# Patient Record
Sex: MALE | Race: WHITE | Employment: FULL TIME | URBAN - NONMETROPOLITAN AREA
[De-identification: names, ages, dates, MRNs, and addresses within clinical notes are randomized per-mention and may not be internally consistent; named-entity substitution may affect disease eponyms.]

---

## 2020-09-06 ENCOUNTER — APPOINTMENT (OUTPATIENT)
Dept: GENERAL RADIOLOGY | Age: 30
DRG: 083 | End: 2020-09-06
Payer: OTHER GOVERNMENT

## 2020-09-06 ENCOUNTER — HOSPITAL ENCOUNTER (INPATIENT)
Age: 30
LOS: 3 days | Discharge: HOME | DRG: 083 | End: 2020-09-09
Attending: FAMILY MEDICINE | Admitting: SURGERY
Payer: OTHER GOVERNMENT

## 2020-09-06 ENCOUNTER — APPOINTMENT (OUTPATIENT)
Dept: CT IMAGING | Age: 30
DRG: 083 | End: 2020-09-06
Payer: OTHER GOVERNMENT

## 2020-09-06 PROBLEM — I61.9 INTRAPARENCHYMAL HEMORRHAGE OF BRAIN (HCC): Status: ACTIVE | Noted: 2020-09-06

## 2020-09-06 PROBLEM — I62.00 SUBDURAL HEMORRHAGE (HCC): Status: ACTIVE | Noted: 2020-09-06

## 2020-09-06 PROBLEM — S12.000A C1 CERVICAL FRACTURE (HCC): Status: ACTIVE | Noted: 2020-09-06
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
## 2020-09-06 PROBLEM — S82.122A: Status: ACTIVE | Noted: 2020-09-06

## 2020-09-06 PROBLEM — S32.009A LUMBAR TRANSVERSE PROCESS FRACTURE (HCC): Status: ACTIVE | Noted: 2020-09-06

## 2020-09-06 LAB
ALBUMIN SERPL-MCNC: 4.6 G/DL (ref 3.5–5.1)
ALP BLD-CCNC: 81 U/L (ref 38–126)
ALT SERPL-CCNC: 25 U/L (ref 11–66)
AMPHETAMINE+METHAMPHETAMINE URINE SCREEN: NEGATIVE
ANION GAP SERPL CALCULATED.3IONS-SCNC: 11 MEQ/L (ref 8–16)
APTT: 29.9 SECONDS (ref 22–38)
AST SERPL-CCNC: 27 U/L (ref 5–40)
BARBITURATE QUANTITATIVE URINE: NEGATIVE
BASOPHILS # BLD: 0.4 %
BASOPHILS ABSOLUTE: 0.1 THOU/MM3 (ref 0–0.1)
BENZODIAZEPINE QUANTITATIVE URINE: NEGATIVE
BILIRUB SERPL-MCNC: 1 MG/DL (ref 0.3–1.2)
BILIRUBIN DIRECT: 0.3 MG/DL (ref 0–0.3)
BILIRUBIN URINE: NEGATIVE
BLOOD, URINE: NEGATIVE
BUN BLDV-MCNC: 12 MG/DL (ref 7–22)
CALCIUM SERPL-MCNC: 9.6 MG/DL (ref 8.5–10.5)
CANNABINOID QUANTITATIVE URINE: POSITIVE
CHARACTER, URINE: CLEAR
CHLORIDE BLD-SCNC: 98 MEQ/L (ref 98–111)
CO2: 23 MEQ/L (ref 23–33)
COCAINE METABOLITE QUANTITATIVE URINE: NEGATIVE
COLOR: YELLOW
CREAT SERPL-MCNC: 0.6 MG/DL (ref 0.4–1.2)
EOSINOPHIL # BLD: 1.6 %
EOSINOPHILS ABSOLUTE: 0.2 THOU/MM3 (ref 0–0.4)
ERYTHROCYTE [DISTWIDTH] IN BLOOD BY AUTOMATED COUNT: 12.6 % (ref 11.5–14.5)
ERYTHROCYTE [DISTWIDTH] IN BLOOD BY AUTOMATED COUNT: 42.7 FL (ref 35–45)
ETHYL ALCOHOL, SERUM: < 0.01 %
GFR SERPL CREATININE-BSD FRML MDRD: > 90 ML/MIN/1.73M2
GLUCOSE BLD-MCNC: 148 MG/DL (ref 70–108)
GLUCOSE, URINE: NEGATIVE MG/DL
HCT VFR BLD CALC: 45.7 % (ref 42–52)
HEMOGLOBIN: 16.2 GM/DL (ref 14–18)
IMMATURE GRANS (ABS): 0.08 THOU/MM3 (ref 0–0.07)
IMMATURE GRANULOCYTES: 0.6 %
INR BLD: 1.01 (ref 0.85–1.13)
KETONES, URINE: NEGATIVE
LEUKOCYTE EST, POC: NEGATIVE
LIPASE: 17.2 U/L (ref 5.6–51.3)
LYMPHOCYTES # BLD: 19 %
LYMPHOCYTES ABSOLUTE: 2.6 THOU/MM3 (ref 1–4.8)
MCH RBC QN AUTO: 32.9 PG (ref 26–33)
MCHC RBC AUTO-ENTMCNC: 35.4 GM/DL (ref 32.2–35.5)
MCV RBC AUTO: 92.7 FL (ref 80–94)
MONOCYTES # BLD: 8.7 %
MONOCYTES ABSOLUTE: 1.2 THOU/MM3 (ref 0.4–1.3)
MRSA SCREEN RT-PCR: NEGATIVE
NITRITE, URINE: NEGATIVE
NUCLEATED RED BLOOD CELLS: 0 /100 WBC
OPIATES, URINE: NEGATIVE
OSMOLALITY CALCULATION: 267 MOSMOL/KG (ref 275–300)
OXYCODONE: NEGATIVE
PH UA: 5.5 (ref 5–9)
PHENCYCLIDINE QUANTITATIVE URINE: NEGATIVE
PLATELET # BLD: 202 THOU/MM3 (ref 130–400)
PMV BLD AUTO: 9.7 FL (ref 9.4–12.4)
POTASSIUM SERPL-SCNC: 3.6 MEQ/L (ref 3.5–5.2)
PROTEIN UA: NEGATIVE MG/DL
RBC # BLD: 4.93 MILL/MM3 (ref 4.7–6.1)
SEG NEUTROPHILS: 69.7 %
SEGMENTED NEUTROPHILS ABSOLUTE COUNT: 9.4 THOU/MM3 (ref 1.8–7.7)
SODIUM BLD-SCNC: 132 MEQ/L (ref 135–145)
SPECIFIC GRAVITY UA: > 1.03 (ref 1–1.03)
TOTAL PROTEIN: 7.5 G/DL (ref 6.1–8)
UROBILINOGEN, URINE: 1 EU/DL (ref 0–1)
VANCOMYCIN RESISTANT ENTEROCOCCUS: NEGATIVE
WBC # BLD: 13.5 THOU/MM3 (ref 4.8–10.8)

## 2020-09-06 PROCEDURE — 83690 ASSAY OF LIPASE: CPT

## 2020-09-06 PROCEDURE — 76376 3D RENDER W/INTRP POSTPROCES: CPT

## 2020-09-06 PROCEDURE — 73564 X-RAY EXAM KNEE 4 OR MORE: CPT

## 2020-09-06 PROCEDURE — 36415 COLL VENOUS BLD VENIPUNCTURE: CPT

## 2020-09-06 PROCEDURE — 85025 COMPLETE CBC W/AUTO DIFF WBC: CPT

## 2020-09-06 PROCEDURE — 71260 CT THORAX DX C+: CPT

## 2020-09-06 PROCEDURE — 73590 X-RAY EXAM OF LOWER LEG: CPT

## 2020-09-06 PROCEDURE — L0180 CER POST COL OCC/MAN SUP ADJ: HCPCS

## 2020-09-06 PROCEDURE — 99222 1ST HOSP IP/OBS MODERATE 55: CPT | Performed by: SURGERY

## 2020-09-06 PROCEDURE — 80307 DRUG TEST PRSMV CHEM ANLYZR: CPT

## 2020-09-06 PROCEDURE — 2500000003 HC RX 250 WO HCPCS: Performed by: PHYSICIAN ASSISTANT

## 2020-09-06 PROCEDURE — 87500 VANOMYCIN DNA AMP PROBE: CPT

## 2020-09-06 PROCEDURE — APPSS180 APP SPLIT SHARED TIME > 60 MINUTES: Performed by: PHYSICIAN ASSISTANT

## 2020-09-06 PROCEDURE — 81003 URINALYSIS AUTO W/O SCOPE: CPT

## 2020-09-06 PROCEDURE — 99283 EMERGENCY DEPT VISIT LOW MDM: CPT

## 2020-09-06 PROCEDURE — 74177 CT ABD & PELVIS W/CONTRAST: CPT

## 2020-09-06 PROCEDURE — 2580000003 HC RX 258: Performed by: PHYSICIAN ASSISTANT

## 2020-09-06 PROCEDURE — 6370000000 HC RX 637 (ALT 250 FOR IP): Performed by: PHYSICIAN ASSISTANT

## 2020-09-06 PROCEDURE — 70450 CT HEAD/BRAIN W/O DYE: CPT

## 2020-09-06 PROCEDURE — 2580000003 HC RX 258: Performed by: FAMILY MEDICINE

## 2020-09-06 PROCEDURE — 85730 THROMBOPLASTIN TIME PARTIAL: CPT

## 2020-09-06 PROCEDURE — 72125 CT NECK SPINE W/O DYE: CPT

## 2020-09-06 PROCEDURE — 87641 MR-STAPH DNA AMP PROBE: CPT

## 2020-09-06 PROCEDURE — 85610 PROTHROMBIN TIME: CPT

## 2020-09-06 PROCEDURE — 80053 COMPREHEN METABOLIC PANEL: CPT

## 2020-09-06 PROCEDURE — 87081 CULTURE SCREEN ONLY: CPT

## 2020-09-06 PROCEDURE — 82248 BILIRUBIN DIRECT: CPT

## 2020-09-06 PROCEDURE — 99285 EMERGENCY DEPT VISIT HI MDM: CPT

## 2020-09-06 PROCEDURE — G0480 DRUG TEST DEF 1-7 CLASSES: HCPCS

## 2020-09-06 PROCEDURE — 6360000004 HC RX CONTRAST MEDICATION: Performed by: FAMILY MEDICINE

## 2020-09-06 PROCEDURE — 2000000000 HC ICU R&B

## 2020-09-06 PROCEDURE — 99284 EMERGENCY DEPT VISIT MOD MDM: CPT

## 2020-09-06 RX ORDER — SODIUM CHLORIDE 9 MG/ML
INJECTION, SOLUTION INTRAVENOUS CONTINUOUS
Status: DISCONTINUED | OUTPATIENT
Start: 2020-09-06 | End: 2020-09-08

## 2020-09-06 RX ORDER — SODIUM CHLORIDE 9 MG/ML
INJECTION, SOLUTION INTRAVENOUS CONTINUOUS
Status: DISCONTINUED | OUTPATIENT
Start: 2020-09-06 | End: 2020-09-06 | Stop reason: HOSPADM

## 2020-09-06 RX ORDER — POLYETHYLENE GLYCOL 3350 17 G/17G
17 POWDER, FOR SOLUTION ORAL DAILY PRN
Status: DISCONTINUED | OUTPATIENT
Start: 2020-09-06 | End: 2020-09-09 | Stop reason: HOSPADM

## 2020-09-06 RX ORDER — POTASSIUM CHLORIDE 7.45 MG/ML
10 INJECTION INTRAVENOUS PRN
Status: DISCONTINUED | OUTPATIENT
Start: 2020-09-06 | End: 2020-09-09 | Stop reason: HOSPADM

## 2020-09-06 RX ORDER — HYDROCODONE BITARTRATE AND ACETAMINOPHEN 5; 325 MG/1; MG/1
2 TABLET ORAL EVERY 4 HOURS PRN
Status: DISCONTINUED | OUTPATIENT
Start: 2020-09-06 | End: 2020-09-09 | Stop reason: HOSPADM

## 2020-09-06 RX ORDER — PROMETHAZINE HYDROCHLORIDE 25 MG/1
12.5 TABLET ORAL EVERY 6 HOURS PRN
Status: DISCONTINUED | OUTPATIENT
Start: 2020-09-06 | End: 2020-09-09 | Stop reason: HOSPADM

## 2020-09-06 RX ORDER — HYDROCODONE BITARTRATE AND ACETAMINOPHEN 5; 325 MG/1; MG/1
1 TABLET ORAL EVERY 4 HOURS PRN
Status: DISCONTINUED | OUTPATIENT
Start: 2020-09-06 | End: 2020-09-09 | Stop reason: HOSPADM

## 2020-09-06 RX ORDER — ONDANSETRON 2 MG/ML
4 INJECTION INTRAMUSCULAR; INTRAVENOUS EVERY 6 HOURS PRN
Status: DISCONTINUED | OUTPATIENT
Start: 2020-09-06 | End: 2020-09-09 | Stop reason: HOSPADM

## 2020-09-06 RX ORDER — MONTELUKAST SODIUM 10 MG/1
10 TABLET ORAL NIGHTLY
COMMUNITY

## 2020-09-06 RX ORDER — ACETAMINOPHEN 325 MG/1
650 TABLET ORAL EVERY 4 HOURS PRN
Status: DISCONTINUED | OUTPATIENT
Start: 2020-09-06 | End: 2020-09-09 | Stop reason: HOSPADM

## 2020-09-06 RX ORDER — MORPHINE SULFATE 4 MG/ML
4 INJECTION, SOLUTION INTRAMUSCULAR; INTRAVENOUS
Status: DISCONTINUED | OUTPATIENT
Start: 2020-09-06 | End: 2020-09-09 | Stop reason: HOSPADM

## 2020-09-06 RX ORDER — MORPHINE SULFATE 2 MG/ML
2 INJECTION, SOLUTION INTRAMUSCULAR; INTRAVENOUS
Status: DISCONTINUED | OUTPATIENT
Start: 2020-09-06 | End: 2020-09-09 | Stop reason: HOSPADM

## 2020-09-06 RX ORDER — DOCUSATE SODIUM 100 MG/1
100 CAPSULE, LIQUID FILLED ORAL DAILY
Status: DISCONTINUED | OUTPATIENT
Start: 2020-09-06 | End: 2020-09-09 | Stop reason: HOSPADM

## 2020-09-06 RX ORDER — SODIUM CHLORIDE 0.9 % (FLUSH) 0.9 %
10 SYRINGE (ML) INJECTION PRN
Status: DISCONTINUED | OUTPATIENT
Start: 2020-09-06 | End: 2020-09-09 | Stop reason: HOSPADM

## 2020-09-06 RX ORDER — SODIUM CHLORIDE 0.9 % (FLUSH) 0.9 %
10 SYRINGE (ML) INJECTION EVERY 12 HOURS SCHEDULED
Status: DISCONTINUED | OUTPATIENT
Start: 2020-09-06 | End: 2020-09-09 | Stop reason: HOSPADM

## 2020-09-06 RX ADMIN — IOPAMIDOL 80 ML: 755 INJECTION, SOLUTION INTRAVENOUS at 17:13

## 2020-09-06 RX ADMIN — Medication 10 ML: at 20:11

## 2020-09-06 RX ADMIN — SODIUM CHLORIDE: 9 INJECTION, SOLUTION INTRAVENOUS at 20:09

## 2020-09-06 RX ADMIN — FAMOTIDINE 20 MG: 10 INJECTION INTRAVENOUS at 20:10

## 2020-09-06 RX ADMIN — SODIUM CHLORIDE: 9 INJECTION, SOLUTION INTRAVENOUS at 16:30

## 2020-09-06 RX ADMIN — DOCUSATE SODIUM 100 MG: 100 CAPSULE, LIQUID FILLED ORAL at 20:12

## 2020-09-06 RX ADMIN — TRANEXAMIC ACID 1000 MG: 1 INJECTION, SOLUTION INTRAVENOUS at 19:01

## 2020-09-06 RX ADMIN — HYDROCODONE BITARTRATE AND ACETAMINOPHEN 1 TABLET: 5; 325 TABLET ORAL at 20:10

## 2020-09-06 RX ADMIN — TRANEXAMIC ACID 1000 MG: 1 INJECTION, SOLUTION INTRAVENOUS at 19:18

## 2020-09-06 ASSESSMENT — PAIN DESCRIPTION - ONSET
ONSET_2: ON-GOING
ONSET: ON-GOING
ONSET_3: ON-GOING
ONSET: ON-GOING

## 2020-09-06 ASSESSMENT — PAIN DESCRIPTION - ORIENTATION
ORIENTATION_3: POSTERIOR;UPPER;LOWER
ORIENTATION: LEFT
ORIENTATION_2: UPPER;LOWER
ORIENTATION: LEFT

## 2020-09-06 ASSESSMENT — ENCOUNTER SYMPTOMS
COUGH: 0
VOICE CHANGE: 0
ABDOMINAL DISTENTION: 0
PHOTOPHOBIA: 0
BACK PAIN: 0
FACIAL SWELLING: 0
RHINORRHEA: 0
EYE DISCHARGE: 0
VOMITING: 0
COLOR CHANGE: 1
ABDOMINAL PAIN: 0
EYE PAIN: 0
SORE THROAT: 0
TROUBLE SWALLOWING: 0
NAUSEA: 0
SHORTNESS OF BREATH: 0

## 2020-09-06 ASSESSMENT — PAIN DESCRIPTION - LOCATION
LOCATION_3: NECK
LOCATION_2: BACK
LOCATION: KNEE
LOCATION: KNEE

## 2020-09-06 ASSESSMENT — PAIN DESCRIPTION - DURATION
DURATION_2: CONTINUOUS
DURATION_3: CONTINUOUS

## 2020-09-06 ASSESSMENT — PAIN DESCRIPTION - PAIN TYPE
TYPE: ACUTE PAIN
TYPE_3: ACUTE PAIN
TYPE_2: ACUTE PAIN
TYPE: ACUTE PAIN

## 2020-09-06 ASSESSMENT — PAIN DESCRIPTION - PROGRESSION
CLINICAL_PROGRESSION: NOT CHANGED
CLINICAL_PROGRESSION: NOT CHANGED
CLINICAL_PROGRESSION_3: NOT CHANGED
CLINICAL_PROGRESSION: NOT CHANGED
CLINICAL_PROGRESSION: NOT CHANGED
CLINICAL_PROGRESSION_2: NOT CHANGED
CLINICAL_PROGRESSION: NOT CHANGED

## 2020-09-06 ASSESSMENT — PAIN DESCRIPTION - FREQUENCY
FREQUENCY: CONTINUOUS
FREQUENCY: CONTINUOUS

## 2020-09-06 ASSESSMENT — PAIN DESCRIPTION - DESCRIPTORS
DESCRIPTORS: ACHING
DESCRIPTORS_3: ACHING
DESCRIPTORS: ACHING
DESCRIPTORS_2: ACHING

## 2020-09-06 ASSESSMENT — PAIN DESCRIPTION - INTENSITY
RATING_3: 0
RATING_2: 0

## 2020-09-06 ASSESSMENT — PAIN SCALES - GENERAL
PAINLEVEL_OUTOF10: 5
PAINLEVEL_OUTOF10: 5
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
## 2020-09-06 NOTE — ED NOTES
Pt resting in bed with Aspen collar placed per Dr. Derick Viera. Pt states no further needs at this time. Vital signs stable, will continue to assess.        [facility]  09/06/20 2803
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
## 2020-09-06 NOTE — ED NOTES
Pt A&O X4 at this time. Pt pupils 3mm round and reactive to light. Hand grasp/pedal push and pull strong and equal bilaterally. Pt resting with SO at bedside and call light within reach. Pt states no further needs at this time.        Zoltan, [facility]  09/06/20 4814
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
## 2020-09-06 NOTE — ED NOTES
Pt A&O X4, pupils are 3mm round and reactive to light, hand grasp/pedal push and pull strong and equal bilaterally. Pt resting with SO at bedside. Vital signs stable, will continue to monitor.        [facility]  09/06/20 9572
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
## 2020-09-07 ENCOUNTER — APPOINTMENT (OUTPATIENT)
Dept: MRI IMAGING | Age: 30
DRG: 083 | End: 2020-09-07
Payer: OTHER GOVERNMENT

## 2020-09-07 ENCOUNTER — APPOINTMENT (OUTPATIENT)
Dept: CT IMAGING | Age: 30
DRG: 083 | End: 2020-09-07
Payer: OTHER GOVERNMENT
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
## 2020-09-07 LAB
ANION GAP SERPL CALCULATED.3IONS-SCNC: 7 MEQ/L (ref 8–16)
BASOPHILS # BLD: 0.6 %
BASOPHILS ABSOLUTE: 0.1 THOU/MM3 (ref 0–0.1)
BUN BLDV-MCNC: 7 MG/DL (ref 7–22)
CALCIUM SERPL-MCNC: 8.8 MG/DL (ref 8.5–10.5)
CHLORIDE BLD-SCNC: 103 MEQ/L (ref 98–111)
CO2: 26 MEQ/L (ref 23–33)
CREAT SERPL-MCNC: 0.6 MG/DL (ref 0.4–1.2)
EOSINOPHIL # BLD: 5.3 %
EOSINOPHILS ABSOLUTE: 0.6 THOU/MM3 (ref 0–0.4)
ERYTHROCYTE [DISTWIDTH] IN BLOOD BY AUTOMATED COUNT: 12.8 % (ref 11.5–14.5)
ERYTHROCYTE [DISTWIDTH] IN BLOOD BY AUTOMATED COUNT: 45.3 FL (ref 35–45)
GFR SERPL CREATININE-BSD FRML MDRD: > 90 ML/MIN/1.73M2
GLUCOSE BLD-MCNC: 139 MG/DL (ref 70–108)
HCT VFR BLD CALC: 45.2 % (ref 42–52)
HEMOGLOBIN: 15.5 GM/DL (ref 14–18)
IMMATURE GRANS (ABS): 0.03 THOU/MM3 (ref 0–0.07)
IMMATURE GRANULOCYTES: 0.3 %
LYMPHOCYTES # BLD: 23.9 %
LYMPHOCYTES ABSOLUTE: 2.5 THOU/MM3 (ref 1–4.8)
MCH RBC QN AUTO: 33 PG (ref 26–33)
MCHC RBC AUTO-ENTMCNC: 34.3 GM/DL (ref 32.2–35.5)
MCV RBC AUTO: 96.2 FL (ref 80–94)
MONOCYTES # BLD: 7.4 %
MONOCYTES ABSOLUTE: 0.8 THOU/MM3 (ref 0.4–1.3)
NUCLEATED RED BLOOD CELLS: 0 /100 WBC
PLATELET # BLD: 195 THOU/MM3 (ref 130–400)
PMV BLD AUTO: 9.7 FL (ref 9.4–12.4)
POTASSIUM REFLEX MAGNESIUM: 4.2 MEQ/L (ref 3.5–5.2)
RBC # BLD: 4.7 MILL/MM3 (ref 4.7–6.1)
SEG NEUTROPHILS: 62.5 %
SEGMENTED NEUTROPHILS ABSOLUTE COUNT: 6.5 THOU/MM3 (ref 1.8–7.7)
SODIUM BLD-SCNC: 136 MEQ/L (ref 135–145)
WBC # BLD: 10.4 THOU/MM3 (ref 4.8–10.8)

## 2020-09-07 PROCEDURE — 73721 MRI JNT OF LWR EXTRE W/O DYE: CPT

## 2020-09-07 PROCEDURE — 6370000000 HC RX 637 (ALT 250 FOR IP): Performed by: PHYSICIAN ASSISTANT

## 2020-09-07 PROCEDURE — 80048 BASIC METABOLIC PNL TOTAL CA: CPT

## 2020-09-07 PROCEDURE — APPSS60 APP SPLIT SHARED TIME 46-60 MINUTES: Performed by: PHYSICIAN ASSISTANT

## 2020-09-07 PROCEDURE — 2000000000 HC ICU R&B

## 2020-09-07 PROCEDURE — 2580000003 HC RX 258: Performed by: PHYSICIAN ASSISTANT

## 2020-09-07 PROCEDURE — 94761 N-INVAS EAR/PLS OXIMETRY MLT: CPT

## 2020-09-07 PROCEDURE — 85025 COMPLETE CBC W/AUTO DIFF WBC: CPT

## 2020-09-07 PROCEDURE — 99291 CRITICAL CARE FIRST HOUR: CPT | Performed by: NEUROLOGICAL SURGERY

## 2020-09-07 PROCEDURE — 36415 COLL VENOUS BLD VENIPUNCTURE: CPT

## 2020-09-07 PROCEDURE — 72141 MRI NECK SPINE W/O DYE: CPT

## 2020-09-07 PROCEDURE — 70450 CT HEAD/BRAIN W/O DYE: CPT

## 2020-09-07 PROCEDURE — 2500000003 HC RX 250 WO HCPCS: Performed by: PHYSICIAN ASSISTANT

## 2020-09-07 PROCEDURE — APPSS60 APP SPLIT SHARED TIME 46-60 MINUTES: Performed by: NURSE PRACTITIONER

## 2020-09-07 RX ADMIN — DOCUSATE SODIUM 100 MG: 100 CAPSULE, LIQUID FILLED ORAL at 08:30

## 2020-09-07 RX ADMIN — HYDROCODONE BITARTRATE AND ACETAMINOPHEN 2 TABLET: 5; 325 TABLET ORAL at 20:46

## 2020-09-07 RX ADMIN — FAMOTIDINE 20 MG: 10 INJECTION INTRAVENOUS at 20:46

## 2020-09-07 RX ADMIN — Medication 10 ML: at 08:31

## 2020-09-07 RX ADMIN — HYDROCODONE BITARTRATE AND ACETAMINOPHEN 1 TABLET: 5; 325 TABLET ORAL at 01:12

## 2020-09-07 RX ADMIN — HYDROCODONE BITARTRATE AND ACETAMINOPHEN 1 TABLET: 5; 325 TABLET ORAL at 06:31

## 2020-09-07 RX ADMIN — HYDROCODONE BITARTRATE AND ACETAMINOPHEN 1 TABLET: 5; 325 TABLET ORAL at 16:10

## 2020-09-07 RX ADMIN — SODIUM CHLORIDE: 9 INJECTION, SOLUTION INTRAVENOUS at 06:01

## 2020-09-07 RX ADMIN — HYDROCODONE BITARTRATE AND ACETAMINOPHEN 1 TABLET: 5; 325 TABLET ORAL at 10:28

## 2020-09-07 RX ADMIN — SODIUM CHLORIDE: 9 INJECTION, SOLUTION INTRAVENOUS at 18:06

## 2020-09-07 RX ADMIN — FAMOTIDINE 20 MG: 10 INJECTION INTRAVENOUS at 08:30

## 2020-09-07 ASSESSMENT — PAIN DESCRIPTION - PAIN TYPE
TYPE: ACUTE PAIN

## 2020-09-07 ASSESSMENT — PAIN DESCRIPTION - ONSET: ONSET: ON-GOING

## 2020-09-07 ASSESSMENT — PAIN DESCRIPTION - ORIENTATION
ORIENTATION: LEFT

## 2020-09-07 ASSESSMENT — PAIN SCALES - GENERAL
PAINLEVEL_OUTOF10: 2
PAINLEVEL_OUTOF10: 2
PAINLEVEL_OUTOF10: 4
PAINLEVEL_OUTOF10: 2
PAINLEVEL_OUTOF10: 4
PAINLEVEL_OUTOF10: 2
PAINLEVEL_OUTOF10: 6
PAINLEVEL_OUTOF10: 4
PAINLEVEL_OUTOF10: 3
PAINLEVEL_OUTOF10: 2
PAINLEVEL_OUTOF10: 7

## 2020-09-07 ASSESSMENT — PAIN DESCRIPTION - FREQUENCY: FREQUENCY: CONTINUOUS

## 2020-09-07 ASSESSMENT — PAIN DESCRIPTION - PROGRESSION
CLINICAL_PROGRESSION: NOT CHANGED
CLINICAL_PROGRESSION: GRADUALLY WORSENING
CLINICAL_PROGRESSION: NOT CHANGED

## 2020-09-07 ASSESSMENT — PAIN DESCRIPTION - LOCATION
LOCATION: BACK;KNEE;NECK
LOCATION: KNEE

## 2020-09-07 ASSESSMENT — PAIN DESCRIPTION - DESCRIPTORS: DESCRIPTORS: ACHING

## 2020-09-07 NOTE — PLAN OF CARE
Problem: Pain:  Goal: Pain level will decrease  Description: Pain level will decrease  9/7/2020 1954 by Dennise Sarah  Outcome: Met This Shift  Note: Pt's pain well controlled this shift. Patient states pain goal 2/10. Pt's pain level is rated as a 2/10 in his neck and back, and a 3/10 in his knee. Problem: Pain:  Goal: Control of acute pain  Description: Control of acute pain  9/7/2020 1954 by Dennise Sarah  Outcome: Met This Shift  Note: Pt's pain well controlled this shift. Patient states pain goal 2/10. Pt's pain level is rated as a 2/10 in his neck and back, and a 3/10 in his knee. Problem: Discharge Planning:  Goal: Participates in care planning  Description: Participates in care planning  9/7/2020 1954 by Dennise Sarah  Outcome: Met This Shift  Note: Pt. Participates in care planning and is compliant with orders. Patient hopeful to remove C-collar and move out of ICU tomorrow. Problem: Bowel Function - Altered:  Goal: Bowel elimination is within specified parameters  Description: Bowel elimination is within specified parameters  9/7/2020 1954 by Dennise Sarah  Outcome: Met This Shift  Note: Pt. Has active bowel sounds, soft belly, and making gas. Problem: Mental Status - Impaired:  Goal: Mental status will be restored to baseline  Description: Mental status will be restored to baseline  9/7/2020 1954 by Dennise Sarah  Outcome: Met This Shift  Note: Pt's mental status is at baseline this shift per patients significant other. Problem: Skin Integrity - Impaired:  Goal: Will show no infection signs and symptoms  Description: Will show no infection signs and symptoms  9/7/2020 1954 by Dennise Sarah  Outcome: Met This Shift  Note: Pt. Afebrile this shift. No signs of skin infection.       Problem: Skin Integrity - Impaired:  Goal: Absence of new skin breakdown  Description: Absence of new skin breakdown  9/7/2020 1954 by Dennise Sarah  Outcome: Met This Shift  Note: No new skin breakdown noted
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
## 2020-09-07 NOTE — CONSULTS
day [place] Slice, PA-C   10 mL at 09/07/20 0831    sodium chloride flush 0.9 % injection 10 mL  10 mL Intravenous PRN Kayce Slice, PA-C        acetaminophen (TYLENOL) tablet 650 mg  650 mg Oral Q4H PRN [place] Slice, PA-C        polyethylene glycol [facility]) packet 17 g  17 g Oral Daily PRN [place] Slice, PA-C        promethazine (PHENERGAN) tablet 12.5 mg  12.5 mg Oral Q6H PRN Kayce Slice, PA-C        Or    ondansetron [facility]) injection 4 mg  4 mg Intravenous Q6H PRN Kayce Slice, PA-C        0.9 % sodium chloride infusion   Intravenous Continuous [place] Slice, PA-C 100 mL/hr at 09/07/20 0601      potassium chloride 10 mEq/100 mL IVPB (Peripheral Line)  10 mEq Intravenous PRN [place] Slice, PA-C        HYDROcodone-acetaminophen [facility]) 5-325 MG per tablet 1 tablet  1 tablet Oral Q4H PRN Kayce Slice, PA-C   1 tablet at 09/07/20 1028    Or    HYDROcodone-acetaminophen (NORCO) 5-325 MG per tablet 2 tablet  2 tablet Oral Q4H PRN [place] Slice, PA-C        morphine (PF) injection 2 mg  2 mg Intravenous Q2H PRN Kayce Slice, PA-C        Or    morphine injection 4 mg  4 mg Intravenous Q2H PRN [place] Slice, PA-C        docusate sodium (COLACE) capsule 100 mg  100 mg Oral Daily [place] Slice, PA-C   100 mg at 09/07/20 0830    famotidine (PEPCID) injection 20 mg  20 mg Intravenous BID [place] Slice, PA-C   20 mg at 09/07/20 0830         Allergies:  Seasonal and Sulfa antibiotics    Social History:   Social History     Tobacco Use   Smoking Status Current Every Day Smoker    Packs/day: 1.00   Smokeless Tobacco Former User     Social History     Substance and Sexual Activity   Alcohol Use Yes    Comment: 5 beers/week      Social History     Substance and Sexual Activity   Drug Use Yes    Types: Marijuana    Comment: Medical        Family History:  History reviewed. No pertinent family history.       REVIEW OF SYSTEMS:  Gen: Negative for nausea, vomiting, diarrhea, fever, chills, night sweats  Heart: Negative for HTN, palpitations, chest pain  Lungs: Negative for wheezes, asthma or SOB  GI: Negative for nausea, vomiting  Endo: Negative for diabetes  Heme: Negative for DVT   Musculoskeletal:  Positive for arthralgia, joint effusion, joint stiffness      PHYSICAL EXAM:  Patient Vitals for the past 24 hrs:   BP Temp Temp src Pulse Resp SpO2 Height Weight   09/07/20 1030 (!) 146/85 -- -- 82 15 95 % -- --   09/07/20 1000 (!) 147/83 -- -- 85 23 -- -- --   09/07/20 0900 (!) 120/104 -- -- 76 19 -- -- --   09/07/20 0830 120/80 -- -- 79 16 95 % -- --   09/07/20 0800 122/83 -- -- 79 21 -- -- --   09/07/20 0730 125/88 -- -- 92 23 -- -- --   09/07/20 0700 128/76 -- -- 74 13 97 % -- --   09/07/20 0600 127/86 -- -- 84 15 96 % -- --   09/07/20 0500 107/73 97.3 °F (36.3 °C) Oral 78 12 96 % -- 194 lb 10.7 oz (88.3 kg)   09/07/20 0400 117/72 -- -- 74 12 96 % -- --   09/07/20 0300 (!) 117/59 -- -- 76 13 96 % -- --   09/07/20 0200 124/81 -- -- 91 19 96 % -- --   09/07/20 0100 120/74 98.1 °F (36.7 °C) Oral 88 16 95 % -- --   09/07/20 0030 122/84 -- -- 88 18 96 % -- --   09/07/20 0000 117/78 -- -- 85 14 95 % -- --   09/06/20 2330 128/76 -- -- 85 18 96 % -- --   09/06/20 2300 128/78 -- -- 88 16 95 % -- --   09/06/20 2230 119/77 -- -- 82 15 95 % -- --   09/06/20 2200 128/79 -- -- 75 13 97 % -- --   09/06/20 2145 129/76 -- -- 93 16 97 % -- --   09/06/20 2130 131/80 -- -- 82 17 95 % -- --   09/06/20 2115 130/75 -- -- 80 16 95 % -- --   09/06/20 2100 128/88 -- -- 82 16 96 % -- --   09/06/20 2045 (!) 134/99 -- -- 89 15 97 % -- --   09/06/20 2030 (!) 134/93 -- -- 89 15 98 % -- --   09/06/20 2015 (!) 134/94 -- -- 89 16 97 % -- --   09/06/20 2002 -- -- -- -- -- -- 6' 3\" (1.905 m) 192 lb 10.9 oz (87.4 kg)   09/06/20 2000 (!) 145/67 98.4 °F (36.9 °C) Oral 85 17 96 % -- --   09/06/20 1930 (!) 135/95 -- -- 97 18 98 % -- --   09/06/20 1915 (!) 134/96 -- -- 92 18 98 % -- --   09/06/20 1900 (!) 145/94 -- -- 97 18 98 % -- --   09/06/20 1845 (!) 141/91 -- -- 96 16 98 % -- --   09/06/20 1830 129/89 -- -- 91 18 97 % -- --   09/06/20 1800 127/88 -- -- 99 16 96 % -- --   09/06/20 1557 128/83 98.5 °F (36.9 °C) Oral 97 18 96 % 6' 3\" (1.905 m) 205 lb (93 kg)     Gen: alert and oriented x3. Does not appear to be in any acute distress at this time. Mood and affect appear to be appropriate. Head: normorcephalic, abrasions noted from head trauma  Pelvis: stable  LLE:  Moderate to severe effusion noted left knee with exquisite tenderness upon palpation of the knee. Mild abrasions noted throughout the left lower extremity however no evidence of traumatic arthrotomy, punctures wounds, or open fractures. Examination of the knee limited secondary to the swelling and patient being unable to fully relax during the exam.  Examination did however show increased pain medial and lateral aspects with palpation and varus/valgus stress testing. Lachman's test inconclusive secondary to swelling/pain. Calf soft and nontender in all compartments. ROM hip, ankle, toes intact without difficulty or pain. Neurovascular status intact. Intact dorsalis pedal and posterior tibialis pulses. Capillary refill brisk and intact with good sensation. RLE:  No obvious deformity noted with inspection. Full ROM hip, knee, ankle, toes without difficulty nor pain. Neurovascular status intact. Intact distal pulses. Intact capillary refill and sensation. Stress  Testing of the right knee with varus/valgus stressing and Lachmans showed no gross laxity or instability.     DATA:  CBC:   Lab Results   Component Value Date    WBC 13.5 09/06/2020    HGB 16.2 09/06/2020     09/06/2020     BMP:    Lab Results   Component Value Date     09/06/2020    K 3.6 09/06/2020    CL 98 09/06/2020    CO2 23 09/06/2020    BUN 12 09/06/2020    CREATININE 0.6 09/06/2020    CALCIUM 9.6 09/06/2020    GLUCOSE 148 09/06/2020     PT/INR:    Lab Results   Component

## 2020-09-07 NOTE — PROGRESS NOTES
ORTHO PROGRESS NOTE UPDATE    MRI left knee reviewed, consistent with ACL injury  Will look to get patient to one of the ortho sports specialists in our practice to further evaluate, most likely as an outpatient    Daniela Garza PA-C
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
## 2020-09-07 NOTE — ED NOTES
This nurse contacts ICU for report at this time. Feliberto Zarate RN states he will arrive shortly to ED for pt report prior to transport.        [place]  09/06/20 2001
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
## 2020-09-07 NOTE — PLAN OF CARE
Problem: Pain:  Description: Pain management should include both nonpharmacologic and pharmacologic interventions. Goal: Pain level will decrease  Description: Pain level will decrease  Outcome: Ongoing  Note: Patient has intermittent complaints of pain. Medication given as prescribed. Repositioned for comfort. .       Problem: Discharge Planning:  Goal: Participates in care planning  Description: Participates in care planning  Outcome: Ongoing  Note: Active in care planning. Goal: Discharged to appropriate level of care  Description: Discharged to appropriate level of care  Outcome: Ongoing  Note: Patient is not a discharge candidate at this time. Planning in process, patient plans to return home with family. Evaluation of any services needed to be addressed. Problem: Bowel Function - Altered:  Goal: Bowel elimination is within specified parameters  Description: Bowel elimination is within specified parameters  Outcome: Ongoing  Note: No bms this shift, colace given. Problem: Mental Status - Impaired:  Goal: Mental status will be restored to baseline  Description: Mental status will be restored to baseline  Outcome: Ongoing  Note: No neuro deficits. Problem: Skin Integrity - Impaired:  Goal: Absence of new skin breakdown  Description: Absence of new skin breakdown  Outcome: Ongoing  Note: No skin breakdown noted to bony prominences or coccyx area. Patient is turned every 2 hours and prn with back care given at this time. Care plan reviewed with patient and wife. Patient and wife verbalize understanding of the plan of care and contribute to goal setting.

## 2020-09-08 LAB — MRSA SCREEN: NORMAL

## 2020-09-08 PROCEDURE — APPSS30 APP SPLIT SHARED TIME 16-30 MINUTES: Performed by: PHYSICIAN ASSISTANT

## 2020-09-08 PROCEDURE — 99233 SBSQ HOSP IP/OBS HIGH 50: CPT | Performed by: NEUROLOGICAL SURGERY

## 2020-09-08 PROCEDURE — 2060000000 HC ICU INTERMEDIATE R&B

## 2020-09-08 PROCEDURE — 6370000000 HC RX 637 (ALT 250 FOR IP): Performed by: PHYSICIAN ASSISTANT

## 2020-09-08 PROCEDURE — 97165 OT EVAL LOW COMPLEX 30 MIN: CPT

## 2020-09-08 PROCEDURE — 2500000003 HC RX 250 WO HCPCS: Performed by: PHYSICIAN ASSISTANT

## 2020-09-08 PROCEDURE — 97162 PT EVAL MOD COMPLEX 30 MIN: CPT

## 2020-09-08 PROCEDURE — 2580000003 HC RX 258: Performed by: PHYSICIAN ASSISTANT

## 2020-09-08 PROCEDURE — 97535 SELF CARE MNGMENT TRAINING: CPT

## 2020-09-08 PROCEDURE — 92523 SPEECH SOUND LANG COMPREHEN: CPT

## 2020-09-08 PROCEDURE — 97116 GAIT TRAINING THERAPY: CPT

## 2020-09-08 PROCEDURE — APPSS60 APP SPLIT SHARED TIME 46-60 MINUTES: Performed by: PHYSICIAN ASSISTANT

## 2020-09-08 PROCEDURE — 94761 N-INVAS EAR/PLS OXIMETRY MLT: CPT

## 2020-09-08 PROCEDURE — 99232 SBSQ HOSP IP/OBS MODERATE 35: CPT | Performed by: SURGERY

## 2020-09-08 RX ADMIN — Medication 10 ML: at 21:55

## 2020-09-08 RX ADMIN — DOCUSATE SODIUM 100 MG: 100 CAPSULE, LIQUID FILLED ORAL at 08:26

## 2020-09-08 RX ADMIN — FAMOTIDINE 20 MG: 10 INJECTION INTRAVENOUS at 21:55

## 2020-09-08 RX ADMIN — ACETAMINOPHEN 650 MG: 325 TABLET ORAL at 13:38

## 2020-09-08 RX ADMIN — HYDROCODONE BITARTRATE AND ACETAMINOPHEN 1 TABLET: 5; 325 TABLET ORAL at 08:25

## 2020-09-08 RX ADMIN — HYDROCODONE BITARTRATE AND ACETAMINOPHEN 1 TABLET: 5; 325 TABLET ORAL at 21:55

## 2020-09-08 RX ADMIN — HYDROCODONE BITARTRATE AND ACETAMINOPHEN 1 TABLET: 5; 325 TABLET ORAL at 04:13

## 2020-09-08 RX ADMIN — SODIUM CHLORIDE: 9 INJECTION, SOLUTION INTRAVENOUS at 05:19

## 2020-09-08 RX ADMIN — Medication 10 ML: at 08:26

## 2020-09-08 RX ADMIN — FAMOTIDINE 20 MG: 10 INJECTION INTRAVENOUS at 08:25

## 2020-09-08 ASSESSMENT — PAIN DESCRIPTION - ORIENTATION
ORIENTATION_2: POSTERIOR
ORIENTATION: LEFT
ORIENTATION: LEFT

## 2020-09-08 ASSESSMENT — PAIN DESCRIPTION - FREQUENCY
FREQUENCY: CONTINUOUS
FREQUENCY: CONTINUOUS

## 2020-09-08 ASSESSMENT — PAIN SCALES - GENERAL
PAINLEVEL_OUTOF10: 2
PAINLEVEL_OUTOF10: 4
PAINLEVEL_OUTOF10: 2
PAINLEVEL_OUTOF10: 4
PAINLEVEL_OUTOF10: 2
PAINLEVEL_OUTOF10: 1
PAINLEVEL_OUTOF10: 2
PAINLEVEL_OUTOF10: 7
PAINLEVEL_OUTOF10: 7
PAINLEVEL_OUTOF10: 3

## 2020-09-08 ASSESSMENT — PAIN DESCRIPTION - DESCRIPTORS
DESCRIPTORS: ACHING
DESCRIPTORS: ACHING
DESCRIPTORS_2: ACHING

## 2020-09-08 ASSESSMENT — PAIN DESCRIPTION - PROGRESSION
CLINICAL_PROGRESSION_2: NOT CHANGED
CLINICAL_PROGRESSION: GRADUALLY WORSENING
CLINICAL_PROGRESSION: GRADUALLY IMPROVING

## 2020-09-08 ASSESSMENT — PAIN DESCRIPTION - PAIN TYPE
TYPE: ACUTE PAIN
TYPE_2: ACUTE PAIN

## 2020-09-08 ASSESSMENT — PAIN DESCRIPTION - ONSET
ONSET_2: ON-GOING
ONSET: ON-GOING
ONSET: ON-GOING

## 2020-09-08 ASSESSMENT — PAIN DESCRIPTION - LOCATION
LOCATION_2: NECK
LOCATION: KNEE
LOCATION: BACK;KNEE;NECK
LOCATION: KNEE

## 2020-09-08 ASSESSMENT — PAIN DESCRIPTION - INTENSITY: RATING_2: 2

## 2020-09-08 ASSESSMENT — PAIN DESCRIPTION - DURATION: DURATION_2: CONTINUOUS
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
## 2020-09-08 NOTE — PLAN OF CARE
Problem: Discharge Planning:  Goal: Participates in care planning  Description: Participates in care planning  Outcome: Met This Shift  Note: Pt alert and wife at bedside, offering input to plan of care     Problem: Mental Status - Impaired:  Goal: Mental status will be restored to baseline  Description: Mental status will be restored to baseline  Outcome: Met This Shift  Note: Pt is alert and oriented. Continue to monitor     Problem: Falls - Risk of:  Goal: Will remain free from falls  Description: Will remain free from falls  Outcome: Met This Shift  Note: No fall this shift. Pt is alert and cooperative with use of call light. Frequent checks and hourly rounds to assess needs. Bed alarm on     Problem: Falls - Risk of:  Goal: Absence of physical injury  Description: Absence of physical injury  Outcome: Met This Shift     Problem: Pain:  Goal: Pain level will decrease  Description: Pain level will decrease  Outcome: Ongoing  Note: Complains of minimal pain neck and knee. States norco makes him behave funny, so may just medicate with tylenol from now on. Reassess and adjust as needed     Problem: Pain:  Goal: Control of acute pain  Description: Control of acute pain  Outcome: Ongoing     Problem: Pain:  Goal: Control of chronic pain  Description: Control of chronic pain  Outcome: Ongoing     Problem: Discharge Planning:  Goal: Discharged to appropriate level of care  Description: Discharged to appropriate level of care  Outcome: Ongoing  Note: Plan transfer to stepdown today and discharge in near future     Problem: Bowel Function - Altered:  Goal: Bowel elimination is within specified parameters  Description: Bowel elimination is within specified parameters  Outcome: Ongoing  Note: Taking colace as ordered. No BM yet today.      Problem: Skin Integrity - Impaired:  Goal: Will show no infection signs and symptoms  Description: Will show no infection signs and symptoms  Outcome: Ongoing     Problem: Skin Integrity - Impaired:  Goal: Absence of new skin breakdown  Description: Absence of new skin breakdown  Outcome: Ongoing  Note: Assist pt with repositioning. Keep heels and elbows elevated off bed. Watch for pressure from medical devices. Problem: Skin Integrity:  Goal: Will show no infection signs and symptoms  Description: Will show no infection signs and symptoms  Outcome: Ongoing     Problem: Skin Integrity:  Goal: Absence of new skin breakdown  Description: Absence of new skin breakdown  Outcome: Ongoing  Note: Assist with repositioning every 2 hours and PRN. Keep heels and elbows elevated off bed. Watch for pressure from medical devices. Care plan reviewed with patient and family. Patient and family verbalize understanding of the plan of care and contribute to goal setting.

## 2020-09-08 NOTE — PROGRESS NOTES
1925: Patient states his pain level in his neck and back is a 2/10: sore and achy. Communicated with patient that this nurse would contact Dr. Issac Aguillon to see if able to remove the C-collar based upon the read of the MRI completed today. 1949: Sent message to Dr. Issac Aguillon via perfect serve with the MRI results, awaiting response. 1951: Dr. Issac Aguillon responded stating to keep the C-collar in place, but patient can begin to mobilize with the collar in place. 2000: Pt. Removed from reverse trendelenburg position. Placed head of bed at 60 degrees and patient able to turn self in bed at this time. Pt. Provided with a pillow under his head and states he does not have an increase in pain with this movement. 2025: Pt. Up to the side of the bed with no increase in pain verbalized. Pt. Able to ambulate to the toilet with minimal assistance. Pt. Complains of stiffness in his left knee. Pt. Able to ambulate around the room with no increase in pain in his neck or back. 2040: Pt. Back in bed with minimal assistance. Pt. States he is much more comfortable with no increased pain. 0000: Pt. Able to ambulate to the toilet again. Pt. Does not complain of any increased pain with ambulation\mobilization. Pt. States his pain is a 1/10 at this time.
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
## 2020-09-09 VITALS
OXYGEN SATURATION: 96 % | SYSTOLIC BLOOD PRESSURE: 145 MMHG | DIASTOLIC BLOOD PRESSURE: 81 MMHG | HEIGHT: 75 IN | WEIGHT: 192.6 LBS | HEART RATE: 82 BPM | BODY MASS INDEX: 23.95 KG/M2 | TEMPERATURE: 98.3 F | RESPIRATION RATE: 20 BRPM

## 2020-09-09 PROCEDURE — APPSS60 APP SPLIT SHARED TIME 46-60 MINUTES: Performed by: PHYSICIAN ASSISTANT

## 2020-09-09 PROCEDURE — 2580000003 HC RX 258: Performed by: PHYSICIAN ASSISTANT

## 2020-09-09 PROCEDURE — APPNB180 APP NON BILLABLE TIME > 60 MINS: Performed by: PHYSICIAN ASSISTANT

## 2020-09-09 PROCEDURE — 2500000003 HC RX 250 WO HCPCS: Performed by: PHYSICIAN ASSISTANT

## 2020-09-09 PROCEDURE — 6370000000 HC RX 637 (ALT 250 FOR IP): Performed by: PHYSICIAN ASSISTANT

## 2020-09-09 PROCEDURE — 99239 HOSP IP/OBS DSCHRG MGMT >30: CPT | Performed by: SURGERY

## 2020-09-09 PROCEDURE — 97530 THERAPEUTIC ACTIVITIES: CPT

## 2020-09-09 RX ORDER — HYDROCODONE BITARTRATE AND ACETAMINOPHEN 5; 325 MG/1; MG/1
1 TABLET ORAL EVERY 6 HOURS PRN
Qty: 12 TABLET | Refills: 0 | Status: SHIPPED | OUTPATIENT
Start: 2020-09-09 | End: 2020-09-12

## 2020-09-09 RX ADMIN — ACETAMINOPHEN 650 MG: 325 TABLET ORAL at 09:30

## 2020-09-09 RX ADMIN — FAMOTIDINE 20 MG: 10 INJECTION INTRAVENOUS at 09:30

## 2020-09-09 RX ADMIN — Medication 10 ML: at 09:30

## 2020-09-09 RX ADMIN — DOCUSATE SODIUM 100 MG: 100 CAPSULE, LIQUID FILLED ORAL at 09:30

## 2020-09-09 ASSESSMENT — PAIN DESCRIPTION - LOCATION: LOCATION: NECK

## 2020-09-09 ASSESSMENT — PAIN DESCRIPTION - PROGRESSION

## 2020-09-09 ASSESSMENT — PAIN - FUNCTIONAL ASSESSMENT: PAIN_FUNCTIONAL_ASSESSMENT: ACTIVITIES ARE NOT PREVENTED

## 2020-09-09 ASSESSMENT — PAIN DESCRIPTION - PAIN TYPE: TYPE: ACUTE PAIN

## 2020-09-09 ASSESSMENT — PAIN SCALES - GENERAL
PAINLEVEL_OUTOF10: 2
PAINLEVEL_OUTOF10: 2

## 2020-09-09 ASSESSMENT — PAIN DESCRIPTION - ORIENTATION: ORIENTATION: LEFT

## 2020-09-09 ASSESSMENT — PAIN DESCRIPTION - DESCRIPTORS: DESCRIPTORS: ACHING;TIGHTNESS

## 2020-09-09 ASSESSMENT — PAIN DESCRIPTION - ONSET: ONSET: ON-GOING

## 2020-09-09 ASSESSMENT — PAIN DESCRIPTION - FREQUENCY: FREQUENCY: INTERMITTENT
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
## 2020-09-09 NOTE — PROGRESS NOTES
Pt discharged in stable condition with wife. Reviewed AVS with patient and wife including follow up appointments, neck and knee braces. Pt to wear Aspen neck brace until seen at follow up appointment with Dr Jose Guadalupe Arcos 10/14/2020. Okay to drive and go to work per Dr Pastor Gutierrez. No further questions or concerns noted.

## 2020-09-09 NOTE — DISCHARGE SUMMARY
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 >1.030 (A) 1.002 - 1.030    Blood, Urine NEGATIVE NEGATIVE    pH, UA 5.5 5.0 - 9.0    Protein, UA NEGATIVE NEGATIVE mg/dl    Urobilinogen, Urine 1.0 0.0 - 1.0 eu/dl    Nitrite, Urine NEGATIVE NEGATIVE    Leukocytes, UA NEGATIVE NEGATIVE    Color, UA YELLOW YELLOW-STRAW    Character, Urine CLEAR CLR-SL.[id number]   Culture, MRSA, Screening    Collection Time: 09/06/20  8:15 PM    Specimen: Rectum   Result Value Ref Range    MRSA SCREEN No MRSA isolated     MRSA by PCR    Collection Time: 09/06/20  8:15 PM   Result Value Ref Range    MRSA SCREEN RT-PCR NEGATIVE    VRE Screen by PCR    Collection Time: 09/06/20  8:15 PM    Specimen: Rectal Swab   Result Value Ref Range    Vancomycin Resistant Enterococcus NEGATIVE    Basic Metabolic Panel w/ Reflex to MG    Collection Time: 09/07/20 12:03 PM   Result Value Ref Range    Sodium 136 135 - 145 meq/L    Potassium reflex Magnesium 4.2 3.5 - 5.2 meq/L    Chloride 103 98 - 111 meq/L    CO2 26 23 - 33 meq/L    Glucose 139 (H) 70 - 108 mg/dL    BUN 7 7 - 22 mg/dL    CREATININE 0.6 0.4 - 1.2 mg/dL    Calcium 8.8 8.5 - 10.5 mg/dL   CBC auto differential    Collection Time: 09/07/20 12:03 PM   Result Value Ref Range    WBC 10.4 4.8 - 10.8 thou/mm3    RBC 4.70 4.70 - 6.10 mill/mm3    Hemoglobin 15.5 14.0 - 18.0 gm/dl    Hematocrit 45.2 42.0 - 52.0 %    MCV 96.2 (H) 80.0 - 94.0 fL    MCH 33.0 26.0 - 33.0 pg    MCHC 34.3 32.2 - 35.5 gm/dl    RDW-CV 12.8 11.5 - 14.5 %    RDW-SD 45.3 (H) 35.0 - 45.0 fL    Platelets 106 713 - 510 thou/mm3    MPV 9.7 9.4 - 12.4 fL    Seg Neutrophils 62.5 %    Lymphocytes 23.9 %    Monocytes 7.4 %    Eosinophils 5.3 %    Basophils 0.6 %    Immature Granulocytes 0.3 %    Segs Absolute 6.5 1.8 - 7.7 thou/mm3    Lymphocytes Absolute 2.5 1.0 - 4.8 thou/mm3    Monocytes Absolute 0.8 0.4 - 1.3 thou/mm3    Eosinophils Absolute 0.6 (H) 0.0 - 0.4 thou/mm3    Basophils Absolute 0.1 0.0 - 0.1 thou/mm3    Immature Grans (Abs) 0.03 0.00 - 0.07 thou/mm3    nRBC 0 /100 wbc   Anion Gap    Collection Time: 09/07/20 12:03 PM   Result Value Ref Range    Anion Gap 7.0 (L) 8.0 - 16.0 meq/L   Glomerular Filtration Rate, Estimated    Collection Time: 09/07/20 12:03 PM   Result Value Ref Range    Est, Glom Filt Rate >90 ml/min/1.73m2       Discharge condition: Stable  Disposition: Home  Time spent on discharge: >60 minutes     Electronically signed by Maggy Corley PA-C on 9/9/2020 at 12:39 PM
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
## 2020-09-09 NOTE — PLAN OF CARE
Problem: Pain:  Goal: Pain level will decrease  Description: Pain level will decrease  Outcome: Ongoing  Note: Patient complains of some left leg pain 4/10. PRN Norco given as ordered. Patient responds well to this. Will continue to monitor. Problem: Pain:  Goal: Control of acute pain  Description: Control of acute pain  Outcome: Ongoing  Note: Patient complains of some left leg pain 4/10. PRN Norco given as ordered. Patient responds well to this. Will continue to monitor. Problem: Pain:  Goal: Control of chronic pain  Description: Control of chronic pain  Outcome: Ongoing  Note: Patient complains of some left leg pain 4/10. PRN Norco given as ordered. Patient responds well to this. Will continue to monitor. Problem: Discharge Planning:  Goal: Participates in care planning  Description: Participates in care planning  Outcome: Ongoing  Note: Patient actively involved in planning of care. Problem: Discharge Planning:  Goal: Discharged to appropriate level of care  Description: Discharged to appropriate level of care  Outcome: Ongoing  Note: Discharge planning in progress at this time. Patient planning to d/c home today with wife and kids. Problem: Bowel Function - Altered:  Goal: Bowel elimination is within specified parameters  Description: Bowel elimination is within specified parameters  Outcome: Ongoing  Note: No BM note during this shift. Problem: Mental Status - Impaired:  Goal: Mental status will be restored to baseline  Description: Mental status will be restored to baseline  Outcome: Ongoing  Note: Patient A&O x4 during this shift. Problem: Skin Integrity - Impaired:  Goal: Will show no infection signs and symptoms  Description: Will show no infection signs and symptoms  Outcome: Ongoing  Note: No new skin integrity issues noted during this shift. No signs and/or symptoms of infection noted during this shift. Patient remains afebrile.  Patient able to turn self in bed as needed; staff assisted provided as needed with pillow support. Bilateral lower extremities floated off bed. Problem: Skin Integrity - Impaired:  Goal: Absence of new skin breakdown  Description: Absence of new skin breakdown  Outcome: Ongoing  Note: No new skin integrity issues noted during this shift. No signs and/or symptoms of infection noted during this shift. Patient remains afebrile. Patient able to turn self in bed as needed; staff assisted provided as needed with pillow support. Bilateral lower extremities floated off bed. Problem: Falls - Risk of:  Goal: Will remain free from falls  Description: Will remain free from falls  Outcome: Ongoing  Note: Patient educated on and encouraged to use the call light for assistance from staff with needs. Patient verbalizes an understanding of this. Bed wheels locked and bed in lowest position; bed alarm on. Patient possessions within reach; pathways are clear. Problem: Falls - Risk of:  Goal: Absence of physical injury  Description: Absence of physical injury  Outcome: Ongoing  Note: Patient educated on and encouraged to use the call light for assistance from staff with needs. Patient verbalizes an understanding of this. Bed wheels locked and bed in lowest position; bed alarm on. Patient possessions within reach; pathways are clear. Care plan reviewed with patient. Patient verbalizes understanding of the plan of care and contributes to goal setting.

## 2020-09-09 NOTE — CARE COORDINATION
9/9/20, 12:37 PM EDT    Patient goals/plan/ treatment preferences discussed by  and . Patient goals/plan/ treatment preferences reviewed with patient/ family. Patient/ family verbalize understanding of discharge plan and are in agreement with goal/plan/treatment preferences. Understanding was demonstrated using the teach back method. AVS provided by RN at time of discharge, which includes all necessary medical information pertaining to the patients current course of illness, treatment, post-discharge goals of care, and treatment preferences. Pt to be discharged to home with spouse. He will follow with his PCP at the [facility]. [address] [facility].

## 2020-10-14 ENCOUNTER — OFFICE VISIT (OUTPATIENT)
Dept: NEUROSURGERY | Age: 30
End: 2020-10-14
Payer: OTHER GOVERNMENT

## 2020-10-14 VITALS
HEIGHT: 75 IN | BODY MASS INDEX: 23.96 KG/M2 | DIASTOLIC BLOOD PRESSURE: 80 MMHG | WEIGHT: 192.68 LBS | TEMPERATURE: 96.6 F | HEART RATE: 85 BPM | SYSTOLIC BLOOD PRESSURE: 119 MMHG

## 2020-10-14 PROCEDURE — 99214 OFFICE O/P EST MOD 30 MIN: CPT | Performed by: NEUROLOGICAL SURGERY

## 2020-10-14 PROCEDURE — 1111F DSCHRG MED/CURRENT MED MERGE: CPT | Performed by: NEUROLOGICAL SURGERY
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
## 2020-10-20 ENCOUNTER — HOSPITAL ENCOUNTER (OUTPATIENT)
Dept: CT IMAGING | Age: 30
Discharge: HOME | End: 2020-10-20
Payer: OTHER GOVERNMENT

## 2020-10-20 PROCEDURE — 72125 CT NECK SPINE W/O DYE: CPT

## 2020-10-20 PROCEDURE — 70450 CT HEAD/BRAIN W/O DYE: CPT

## 2020-11-06 ENCOUNTER — OFFICE VISIT (OUTPATIENT)
Dept: NEUROSURGERY | Age: 30
End: 2020-11-06
Payer: OTHER GOVERNMENT

## 2020-11-06 VITALS
HEIGHT: 75 IN | BODY MASS INDEX: 23.96 KG/M2 | HEART RATE: 102 BPM | WEIGHT: 192.68 LBS | TEMPERATURE: 97.7 F | DIASTOLIC BLOOD PRESSURE: 84 MMHG | SYSTOLIC BLOOD PRESSURE: 136 MMHG

## 2020-11-06 PROCEDURE — 99214 OFFICE O/P EST MOD 30 MIN: CPT | Performed by: NEUROLOGICAL SURGERY

## 2020-11-06 RX ORDER — FLUTICASONE PROPIONATE 50 MCG
SPRAY, SUSPENSION (ML) NASAL
COMMUNITY
Start: 2020-09-24

## 2020-11-06 RX ORDER — COVID-19 ANTIGEN TEST
KIT MISCELLANEOUS DAILY
COMMUNITY
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
## 2021-01-26 ENCOUNTER — HOSPITAL ENCOUNTER (OUTPATIENT)
Dept: CT IMAGING | Age: 31
Discharge: HOME | End: 2021-01-26
Payer: OTHER GOVERNMENT
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
## 2021-01-26 DIAGNOSIS — S12.091D OTHER CLOSED NONDISPLACED FRACTURE OF FIRST CERVICAL VERTEBRA WITH ROUTINE HEALING, SUBSEQUENT ENCOUNTER: ICD-10-CM

## 2021-01-26 PROCEDURE — 72125 CT NECK SPINE W/O DYE: CPT

## 2021-02-03 ENCOUNTER — OFFICE VISIT (OUTPATIENT)
Dept: NEUROSURGERY | Age: 31
End: 2021-02-03
Payer: OTHER GOVERNMENT

## 2021-02-03 VITALS
TEMPERATURE: 97.9 F | WEIGHT: 214.2 LBS | HEART RATE: 110 BPM | BODY MASS INDEX: 26.63 KG/M2 | DIASTOLIC BLOOD PRESSURE: 82 MMHG | SYSTOLIC BLOOD PRESSURE: 127 MMHG | HEIGHT: 75 IN

## 2021-02-03 DIAGNOSIS — S12.091S OTHER CLOSED NONDISPLACED FRACTURE OF FIRST CERVICAL VERTEBRA, SEQUELA: Primary | ICD-10-CM

## 2021-02-03 DIAGNOSIS — I62.00 SUBDURAL HEMORRHAGE (HCC): ICD-10-CM

## 2021-02-03 DIAGNOSIS — I61.9 INTRAPARENCHYMAL HEMORRHAGE OF BRAIN (HCC): ICD-10-CM

## 2021-02-03 PROCEDURE — 99213 OFFICE O/P EST LOW 20 MIN: CPT | Performed by: NEUROLOGICAL SURGERY
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
## 2023-10-27 ENCOUNTER — HOSPITAL ENCOUNTER (EMERGENCY)
Age: 33
Discharge: HOME | End: 2023-10-27
Payer: OTHER GOVERNMENT

## 2023-10-27 VITALS
TEMPERATURE: 98.2 F | BODY MASS INDEX: 24.38 KG/M2 | SYSTOLIC BLOOD PRESSURE: 139 MMHG | RESPIRATION RATE: 16 BRPM | HEIGHT: 74 IN | WEIGHT: 190 LBS | DIASTOLIC BLOOD PRESSURE: 95 MMHG | HEART RATE: 98 BPM | OXYGEN SATURATION: 98 %

## 2023-10-27 DIAGNOSIS — H60.391 INFECTIVE OTITIS EXTERNA OF RIGHT EAR: Primary | ICD-10-CM

## 2023-10-27 PROCEDURE — 99213 OFFICE O/P EST LOW 20 MIN: CPT

## 2023-10-27 PROCEDURE — 99203 OFFICE O/P NEW LOW 30 MIN: CPT | Performed by: NURSE PRACTITIONER

## 2023-10-27 ASSESSMENT — PAIN - FUNCTIONAL ASSESSMENT
PAIN_FUNCTIONAL_ASSESSMENT: ACTIVITIES ARE NOT PREVENTED
PAIN_FUNCTIONAL_ASSESSMENT: 0-10

## 2023-10-27 ASSESSMENT — PAIN DESCRIPTION - PAIN TYPE: TYPE: ACUTE PAIN

## 2023-10-27 ASSESSMENT — PAIN DESCRIPTION - ORIENTATION: ORIENTATION: RIGHT

## 2023-10-27 ASSESSMENT — ENCOUNTER SYMPTOMS
NAUSEA: 0
SHORTNESS OF BREATH: 0
SORE THROAT: 0
VOMITING: 0
COUGH: 0

## 2023-10-27 ASSESSMENT — PAIN DESCRIPTION - DESCRIPTORS: DESCRIPTORS: DISCOMFORT

## 2023-10-27 ASSESSMENT — PAIN SCALES - GENERAL: PAINLEVEL_OUTOF10: 7

## 2023-10-27 ASSESSMENT — PAIN DESCRIPTION - FREQUENCY: FREQUENCY: CONTINUOUS

## 2023-10-27 ASSESSMENT — PAIN DESCRIPTION - LOCATION: LOCATION: EAR

## 2023-10-27 ASSESSMENT — PAIN DESCRIPTION - ONSET: ONSET: ON-GOING

## 2023-10-27 NOTE — ED TRIAGE NOTES
Pt to urgent care due to right ear pain and some nasal congestion. New onset of symptoms started roughly 2 days ago with the ear pain. Pt states he always has some slight congestion.

## 2023-10-27 NOTE — ED NOTES
Discharge instructions and prescriptions reviewed with pt. Pt verbalized understanding. Pt ambulated out in stable condition. No change in pain noted upon discharge.      Pankaj Lopez RN  10/27/23 [id number]

## 2023-10-27 NOTE — ED PROVIDER NOTES
[facility]  Urgent Care Encounter       CHIEF COMPLAINT       Chief Complaint   Patient presents with    Otalgia     Right    Nasal Congestion       Nurses Notes reviewed and I agree except as noted in the HPI. HISTORY OF PRESENT ILLNESS   Meseret Pineda is a 35 y.o. male who presents for evaluation of of right ear pain that has been ongoing for the past 2 days. Patient denies any significant upper respiratory complaints other than slight congestion. Patient states that he has taken some over-the-counter sinus medication with minimal relief of the symptoms. States that his ear seems to hurt more with moving his mouth. States that there is a dull ache and pressure in the ear at all times. The history is provided by the patient. REVIEW OF SYSTEMS     Review of Systems   Constitutional:  Negative for chills and fever. HENT:  Positive for ear discharge and ear pain. Negative for congestion and sore throat. Respiratory:  Negative for cough and shortness of breath. Cardiovascular:  Negative for chest pain. Gastrointestinal:  Negative for nausea and vomiting. Musculoskeletal:  Negative for arthralgias and myalgias. Skin:  Negative for rash. Allergic/Immunologic: Negative for immunocompromised state. Neurological:  Negative for headaches. PAST MEDICAL HISTORY         Diagnosis Date    Arthritis     PTSD (post-traumatic stress disorder)        SURGICALHISTORY     Patient  has no past surgical history on file. CURRENT MEDICATIONS       Previous Medications    FLUTICASONE (FLONASE) 50 MCG/ACT NASAL SPRAY    INSTILL 1 SPRAY IN EACH NOSTRIL ONCE DAILY    MONTELUKAST (SINGULAIR) 10 MG TABLET    Take 10 mg by mouth nightly    NAPROXEN SODIUM (ALEVE) 220 MG CAPS    Take by mouth daily       ALLERGIES     Patient is is allergic to seasonal and sulfa antibiotics. Patients   There is no immunization history on file for this patient.     FAMILY HISTORY     Patient's

## 2024-01-15 NOTE — PROGRESS NOTES
[facility] [place] [facility]  [facility] EAR, NOSE AND THROAT  [address]  [address]  [facility] [address]  Dept: [phone]  Dept Fax: [phone]  Loc: [phone]    Mayur Simon is a 33 y.o. male who was referred by No ref. provider found for:  Chief Complaint   Patient presents with    New Patient     New patient here for sinus congestion and ear pain. Patient states the last few years he has been having trouble breathing through his nose.   .    HPI:     Mayur Simon presents today for nasal symptoms.  Patient states for the last several years he is endorse that it is significantly hard to breathe out of his nose.  He describes that he constantly feels like he is mouth breathing and it is very hard to pull air and he describes that it feels like he is breathing out of tiny gauges in his nose.  Patient reports that he gets frequent facial pain surrounding bilateral eyes with headaches once a day usually worse in the morning.  He describes that his symptoms are not nearly as bad in the summer but anytime he gets mildly congested he feels a significant amount of pressure to the front of his face.  Patient describes that he has dealt with migraines in the past that are very bothersome to his daily life.  He endorses that his congestion is specifically worse at nighttime but he feels like he can never blow anything out of his nose.  Patient reports that he snores at night and describes that he wakes up feeling like he is hung over in the morning due to the pressure and discomfort in his head-however he denies drinking.  He never feels well rested in the morning. He does state he has broken his nose approximately 7 times in the past. Patient describes that he recently had a right-sided external ear infection which is rather rare for him.  He denies history of recurrent ear infections or tube placements.  Patient denies history of sinus infections requiring antibiotic therapy or

## 2024-01-17 ENCOUNTER — OFFICE VISIT (OUTPATIENT)
Dept: ENT CLINIC | Age: 34
End: 2024-01-17
Payer: OTHER GOVERNMENT

## 2024-01-17 VITALS
DIASTOLIC BLOOD PRESSURE: 74 MMHG | TEMPERATURE: 97.7 F | HEIGHT: 74 IN | HEART RATE: 83 BPM | BODY MASS INDEX: 24.29 KG/M2 | WEIGHT: 189.3 LBS | SYSTOLIC BLOOD PRESSURE: 122 MMHG | OXYGEN SATURATION: 97 %

## 2024-01-17 DIAGNOSIS — J34.3 HYPERTROPHY, NASAL, TURBINATE: ICD-10-CM

## 2024-01-17 DIAGNOSIS — J34.89 NASAL OBSTRUCTION: ICD-10-CM

## 2024-01-17 DIAGNOSIS — R06.5 MOUTH BREATHING: ICD-10-CM

## 2024-01-17 DIAGNOSIS — R06.83 SNORING: ICD-10-CM

## 2024-01-17 DIAGNOSIS — J34.89 CONCHA BULLOSA: ICD-10-CM

## 2024-01-17 DIAGNOSIS — R51.9 RHINOGENIC HEADACHE: ICD-10-CM

## 2024-01-17 DIAGNOSIS — J34.2 NASAL SEPTAL DEVIATION: Primary | ICD-10-CM

## 2024-01-17 DIAGNOSIS — R44.8 FACIAL PRESSURE: ICD-10-CM

## 2024-01-17 PROCEDURE — 99204 OFFICE O/P NEW MOD 45 MIN: CPT | Performed by: REGISTERED NURSE

## 2024-01-17 RX ORDER — FLUTICASONE PROPIONATE 50 MCG
1 SPRAY, SUSPENSION (ML) NASAL DAILY
Qty: 16 G | Refills: 2 | Status: SHIPPED | OUTPATIENT
Start: 2024-01-17

## 2024-02-12 ENCOUNTER — HOSPITAL ENCOUNTER (OUTPATIENT)
Dept: CT IMAGING | Age: 34
Discharge: HOME | End: 2024-02-12
Attending: REGISTERED NURSE
Payer: OTHER GOVERNMENT

## 2024-02-12 DIAGNOSIS — J34.89 NASAL OBSTRUCTION: ICD-10-CM

## 2024-02-12 DIAGNOSIS — J34.2 NASAL SEPTAL DEVIATION: ICD-10-CM

## 2024-02-12 DIAGNOSIS — R06.5 MOUTH BREATHING: ICD-10-CM

## 2024-02-12 DIAGNOSIS — J34.89 CONCHA BULLOSA: ICD-10-CM

## 2024-02-12 DIAGNOSIS — R51.9 RHINOGENIC HEADACHE: ICD-10-CM

## 2024-02-12 DIAGNOSIS — R06.83 SNORING: ICD-10-CM

## 2024-02-12 DIAGNOSIS — R44.8 FACIAL PRESSURE: ICD-10-CM

## 2024-02-12 PROCEDURE — 70486 CT MAXILLOFACIAL W/O DYE: CPT

## 2024-02-20 ENCOUNTER — OFFICE VISIT (OUTPATIENT)
Dept: ENT CLINIC | Age: 34
End: 2024-02-20
Payer: OTHER GOVERNMENT

## 2024-02-20 VITALS
BODY MASS INDEX: 24.15 KG/M2 | HEIGHT: 74 IN | SYSTOLIC BLOOD PRESSURE: 118 MMHG | TEMPERATURE: 97.7 F | OXYGEN SATURATION: 97 % | HEART RATE: 76 BPM | DIASTOLIC BLOOD PRESSURE: 70 MMHG | WEIGHT: 188.2 LBS | RESPIRATION RATE: 18 BRPM

## 2024-02-20 DIAGNOSIS — R51.9 RHINOGENIC HEADACHE: ICD-10-CM

## 2024-02-20 DIAGNOSIS — J34.3 HYPERTROPHY OF BOTH INFERIOR NASAL TURBINATES: ICD-10-CM

## 2024-02-20 DIAGNOSIS — J34.89 OSTIOMEATAL COMPLEX OBSTRUCTION OF PARANASAL SINUS: ICD-10-CM

## 2024-02-20 DIAGNOSIS — R06.5 MOUTH BREATHING: ICD-10-CM

## 2024-02-20 DIAGNOSIS — R06.83 LOUD SNORING: ICD-10-CM

## 2024-02-20 DIAGNOSIS — J32.0 CHRONIC MAXILLARY SINUSITIS: ICD-10-CM

## 2024-02-20 DIAGNOSIS — J34.89 NASAL OBSTRUCTION: Primary | ICD-10-CM

## 2024-02-20 DIAGNOSIS — Z01.818 PREOP TESTING: Primary | ICD-10-CM

## 2024-02-20 DIAGNOSIS — R44.8 FACIAL PRESSURE: ICD-10-CM

## 2024-02-20 DIAGNOSIS — J32.3 CHRONIC SPHENOIDAL SINUSITIS: ICD-10-CM

## 2024-02-20 DIAGNOSIS — J34.89 CONCHA BULLOSA: ICD-10-CM

## 2024-02-20 PROCEDURE — 99214 OFFICE O/P EST MOD 30 MIN: CPT | Performed by: REGISTERED NURSE

## 2024-02-20 NOTE — PROGRESS NOTES
infection, vision loss and /or eye muscle damage, CSF leak, epiphora (eye watering), potential need for further surgery and possible persistent sinus infections were discussed with the patient. The risks and benefits of alternative procedures, as well as the possible consequences of not undergoing the procedures were discussed, if applicable. All of their questions were answered and they understood no guarantees were made. The patient verbalized understanding and gave consent to proceed.       Electronically signed by LAURA Marquis CNP on 2/20/2024 at 12:36 PM

## 2024-04-05 ENCOUNTER — HOSPITAL ENCOUNTER (OUTPATIENT)
Age: 34
Discharge: HOME | End: 2024-04-05
Payer: OTHER GOVERNMENT

## 2024-04-05 ENCOUNTER — HOSPITAL ENCOUNTER (OUTPATIENT)
Dept: GENERAL RADIOLOGY | Age: 34
Discharge: HOME | End: 2024-04-05
Payer: OTHER GOVERNMENT

## 2024-04-05 DIAGNOSIS — Z01.818 PREOP TESTING: ICD-10-CM

## 2024-04-05 PROCEDURE — 71046 X-RAY EXAM CHEST 2 VIEWS: CPT

## 2024-04-18 NOTE — PROGRESS NOTES
PAT call attempted, patient unavailable, left message to please call us back at your earliest convenience; [phone]

## 2024-04-22 ENCOUNTER — PREP FOR PROCEDURE (OUTPATIENT)
Dept: ENT CLINIC | Age: 34
End: 2024-04-22

## 2024-04-22 DIAGNOSIS — J34.89 CONCHA BULLOSA: ICD-10-CM

## 2024-04-22 DIAGNOSIS — R06.83 LOUD SNORING: ICD-10-CM

## 2024-04-22 DIAGNOSIS — J32.0 CHRONIC MAXILLARY SINUSITIS: ICD-10-CM

## 2024-04-22 DIAGNOSIS — J32.3 CHRONIC SPHENOIDAL SINUSITIS: ICD-10-CM

## 2024-04-22 DIAGNOSIS — R06.5 MOUTH BREATHING: ICD-10-CM

## 2024-04-22 DIAGNOSIS — J34.89 NASAL OBSTRUCTION: ICD-10-CM

## 2024-04-22 DIAGNOSIS — R51.9 RHINOGENIC HEADACHE: Primary | ICD-10-CM

## 2024-04-22 DIAGNOSIS — R44.8 FACIAL PRESSURE: ICD-10-CM

## 2024-04-22 DIAGNOSIS — J34.89 OSTIOMEATAL COMPLEX OBSTRUCTION OF PARANASAL SINUS: ICD-10-CM

## 2024-04-22 DIAGNOSIS — J34.3 HYPERTROPHY OF BOTH INFERIOR NASAL TURBINATES: ICD-10-CM

## 2024-04-28 PROBLEM — J34.89 NASAL OBSTRUCTION: Status: ACTIVE | Noted: 2024-04-28

## 2024-04-28 PROBLEM — R51.9 RHINOGENIC HEADACHE: Status: ACTIVE | Noted: 2024-04-28

## 2024-04-28 PROBLEM — J32.0 CHRONIC MAXILLARY SINUSITIS: Status: ACTIVE | Noted: 2024-04-28

## 2024-04-28 PROBLEM — J34.3 HYPERTROPHY OF BOTH INFERIOR NASAL TURBINATES: Status: ACTIVE | Noted: 2024-04-28

## 2024-04-28 PROBLEM — J34.89 CONCHA BULLOSA: Status: ACTIVE | Noted: 2024-04-28

## 2024-04-28 PROBLEM — J32.3 CHRONIC SPHENOIDAL SINUSITIS: Status: ACTIVE | Noted: 2024-04-28

## 2024-04-28 PROBLEM — J34.89 OSTIOMEATAL COMPLEX OBSTRUCTION OF PARANASAL SINUS: Status: ACTIVE | Noted: 2024-04-28

## 2024-04-28 PROBLEM — R44.8 FACIAL PRESSURE: Status: ACTIVE | Noted: 2024-04-28

## 2024-04-28 PROBLEM — R06.83 LOUD SNORING: Status: ACTIVE | Noted: 2024-04-28

## 2024-04-28 PROBLEM — R06.5 MOUTH BREATHING: Status: ACTIVE | Noted: 2024-04-28

## 2024-04-28 RX ORDER — SODIUM CHLORIDE 0.9 % (FLUSH) 0.9 %
5-40 SYRINGE (ML) INJECTION EVERY 12 HOURS SCHEDULED
Status: CANCELLED | OUTPATIENT
Start: 2024-04-28

## 2024-04-28 RX ORDER — SODIUM CHLORIDE 0.9 % (FLUSH) 0.9 %
5-40 SYRINGE (ML) INJECTION PRN
Status: CANCELLED | OUTPATIENT
Start: 2024-04-28

## 2024-04-28 RX ORDER — SODIUM CHLORIDE 9 MG/ML
INJECTION, SOLUTION INTRAVENOUS PRN
Status: CANCELLED | OUTPATIENT
Start: 2024-04-28

## 2024-04-29 ENCOUNTER — ANESTHESIA EVENT (OUTPATIENT)
Dept: OPERATING ROOM | Age: 34
End: 2024-04-29
Payer: OTHER GOVERNMENT

## 2024-04-29 ENCOUNTER — ANESTHESIA (OUTPATIENT)
Dept: OPERATING ROOM | Age: 34
End: 2024-04-29
Payer: OTHER GOVERNMENT

## 2024-04-29 ENCOUNTER — HOSPITAL ENCOUNTER (OUTPATIENT)
Age: 34
Setting detail: OUTPATIENT SURGERY
Discharge: HOME | End: 2024-04-29
Attending: OTOLARYNGOLOGY | Admitting: OTOLARYNGOLOGY
Payer: OTHER GOVERNMENT

## 2024-04-29 VITALS
SYSTOLIC BLOOD PRESSURE: 145 MMHG | BODY MASS INDEX: 24.49 KG/M2 | HEIGHT: 74 IN | HEART RATE: 82 BPM | RESPIRATION RATE: 18 BRPM | DIASTOLIC BLOOD PRESSURE: 92 MMHG | WEIGHT: 190.8 LBS | TEMPERATURE: 97 F | OXYGEN SATURATION: 100 %

## 2024-04-29 DIAGNOSIS — J34.89 NASAL OBSTRUCTION: ICD-10-CM

## 2024-04-29 DIAGNOSIS — R06.83 LOUD SNORING: Primary | ICD-10-CM

## 2024-04-29 DIAGNOSIS — J32.0 CHRONIC MAXILLARY SINUSITIS: ICD-10-CM

## 2024-04-29 DIAGNOSIS — J34.89 OSTIOMEATAL COMPLEX OBSTRUCTION OF PARANASAL SINUS: ICD-10-CM

## 2024-04-29 DIAGNOSIS — J32.3 CHRONIC SPHENOIDAL SINUSITIS: ICD-10-CM

## 2024-04-29 DIAGNOSIS — J34.3 HYPERTROPHY OF BOTH INFERIOR NASAL TURBINATES: ICD-10-CM

## 2024-04-29 DIAGNOSIS — R06.5 MOUTH BREATHING: ICD-10-CM

## 2024-04-29 DIAGNOSIS — J34.89 CONCHA BULLOSA: ICD-10-CM

## 2024-04-29 DIAGNOSIS — R51.9 RHINOGENIC HEADACHE: ICD-10-CM

## 2024-04-29 DIAGNOSIS — R44.8 FACIAL PRESSURE: ICD-10-CM

## 2024-04-29 PROCEDURE — 7100000000 HC PACU RECOVERY - FIRST 15 MIN: Performed by: OTOLARYNGOLOGY

## 2024-04-29 PROCEDURE — 7100000010 HC PHASE II RECOVERY - FIRST 15 MIN: Performed by: OTOLARYNGOLOGY

## 2024-04-29 PROCEDURE — 30140 RESECT INFERIOR TURBINATE: CPT | Performed by: OTOLARYNGOLOGY

## 2024-04-29 PROCEDURE — 2580000003 HC RX 258: Performed by: OTOLARYNGOLOGY

## 2024-04-29 PROCEDURE — 6360000002 HC RX W HCPCS: Performed by: OTOLARYNGOLOGY

## 2024-04-29 PROCEDURE — 6360000002 HC RX W HCPCS

## 2024-04-29 PROCEDURE — 6360000002 HC RX W HCPCS: Performed by: NURSE ANESTHETIST, CERTIFIED REGISTERED

## 2024-04-29 PROCEDURE — 7100000011 HC PHASE II RECOVERY - ADDTL 15 MIN: Performed by: OTOLARYNGOLOGY

## 2024-04-29 PROCEDURE — 2500000003 HC RX 250 WO HCPCS: Performed by: NURSE ANESTHETIST, CERTIFIED REGISTERED

## 2024-04-29 PROCEDURE — 3700000001 HC ADD 15 MINUTES (ANESTHESIA): Performed by: OTOLARYNGOLOGY

## 2024-04-29 PROCEDURE — 88305 TISSUE EXAM BY PATHOLOGIST: CPT

## 2024-04-29 PROCEDURE — 2720000010 HC SURG SUPPLY STERILE: Performed by: OTOLARYNGOLOGY

## 2024-04-29 PROCEDURE — 2500000003 HC RX 250 WO HCPCS

## 2024-04-29 PROCEDURE — 31287 NASAL/SINUS ENDOSCOPY SURG: CPT | Performed by: OTOLARYNGOLOGY

## 2024-04-29 PROCEDURE — 2500000003 HC RX 250 WO HCPCS: Performed by: OTOLARYNGOLOGY

## 2024-04-29 PROCEDURE — 7100000001 HC PACU RECOVERY - ADDTL 15 MIN: Performed by: OTOLARYNGOLOGY

## 2024-04-29 PROCEDURE — 2580000003 HC RX 258

## 2024-04-29 PROCEDURE — 6370000000 HC RX 637 (ALT 250 FOR IP): Performed by: OTOLARYNGOLOGY

## 2024-04-29 PROCEDURE — 31240 NSL/SNS NDSC CNCH BULL RESCJ: CPT | Performed by: OTOLARYNGOLOGY

## 2024-04-29 PROCEDURE — 3700000000 HC ANESTHESIA ATTENDED CARE: Performed by: OTOLARYNGOLOGY

## 2024-04-29 PROCEDURE — 3600000004 HC SURGERY LEVEL 4 BASE: Performed by: OTOLARYNGOLOGY

## 2024-04-29 PROCEDURE — 31256 EXPLORATION MAXILLARY SINUS: CPT | Performed by: OTOLARYNGOLOGY

## 2024-04-29 PROCEDURE — 3600000014 HC SURGERY LEVEL 4 ADDTL 15MIN: Performed by: OTOLARYNGOLOGY

## 2024-04-29 PROCEDURE — 2709999900 HC NON-CHARGEABLE SUPPLY: Performed by: OTOLARYNGOLOGY

## 2024-04-29 RX ORDER — SUCCINYLCHOLINE/SOD CL,ISO/PF 200MG/10ML
SYRINGE (ML) INTRAVENOUS PRN
Status: DISCONTINUED | OUTPATIENT
Start: 2024-04-29 | End: 2024-04-29 | Stop reason: SDUPTHER

## 2024-04-29 RX ORDER — DEXAMETHASONE SODIUM PHOSPHATE 4 MG/ML
INJECTION, SOLUTION INTRA-ARTICULAR; INTRALESIONAL; INTRAMUSCULAR; INTRAVENOUS; SOFT TISSUE PRN
Status: DISCONTINUED | OUTPATIENT
Start: 2024-04-29 | End: 2024-04-29 | Stop reason: HOSPADM

## 2024-04-29 RX ORDER — SODIUM CHLORIDE 0.9 % (FLUSH) 0.9 %
5-40 SYRINGE (ML) INJECTION PRN
Status: DISCONTINUED | OUTPATIENT
Start: 2024-04-29 | End: 2024-04-29 | Stop reason: HOSPADM

## 2024-04-29 RX ORDER — LABETALOL HYDROCHLORIDE 5 MG/ML
10 INJECTION INTRAVENOUS
Status: DISCONTINUED | OUTPATIENT
Start: 2024-04-29 | End: 2024-04-29 | Stop reason: HOSPADM

## 2024-04-29 RX ORDER — CEFDINIR 300 MG/1
300 CAPSULE ORAL 2 TIMES DAILY
Qty: 20 CAPSULE | Refills: 0 | Status: SHIPPED | OUTPATIENT
Start: 2024-04-29 | End: 2024-05-09

## 2024-04-29 RX ORDER — SODIUM CHLORIDE 0.9 % (FLUSH) 0.9 %
5-40 SYRINGE (ML) INJECTION EVERY 12 HOURS SCHEDULED
Status: DISCONTINUED | OUTPATIENT
Start: 2024-04-29 | End: 2024-04-29 | Stop reason: HOSPADM

## 2024-04-29 RX ORDER — FENTANYL CITRATE 50 UG/ML
INJECTION, SOLUTION INTRAMUSCULAR; INTRAVENOUS PRN
Status: DISCONTINUED | OUTPATIENT
Start: 2024-04-29 | End: 2024-04-29 | Stop reason: SDUPTHER

## 2024-04-29 RX ORDER — SODIUM CHLORIDE 9 MG/ML
INJECTION, SOLUTION INTRAVENOUS CONTINUOUS PRN
Status: DISCONTINUED | OUTPATIENT
Start: 2024-04-29 | End: 2024-04-29 | Stop reason: SDUPTHER

## 2024-04-29 RX ORDER — GINSENG 100 MG
CAPSULE ORAL PRN
Status: DISCONTINUED | OUTPATIENT
Start: 2024-04-29 | End: 2024-04-29 | Stop reason: HOSPADM

## 2024-04-29 RX ORDER — OXYMETAZOLINE HYDROCHLORIDE 0.05 G/100ML
SPRAY NASAL PRN
Status: DISCONTINUED | OUTPATIENT
Start: 2024-04-29 | End: 2024-04-29 | Stop reason: HOSPADM

## 2024-04-29 RX ORDER — PREDNISONE 20 MG/1
20 TABLET ORAL DAILY
Qty: 4 TABLET | Refills: 0 | Status: SHIPPED | OUTPATIENT
Start: 2024-04-29 | End: 2024-05-02

## 2024-04-29 RX ORDER — NALOXONE HYDROCHLORIDE 0.4 MG/ML
INJECTION, SOLUTION INTRAMUSCULAR; INTRAVENOUS; SUBCUTANEOUS PRN
Status: DISCONTINUED | OUTPATIENT
Start: 2024-04-29 | End: 2024-04-29 | Stop reason: HOSPADM

## 2024-04-29 RX ORDER — HYDROCODONE BITARTRATE AND ACETAMINOPHEN 7.5; 325 MG/1; MG/1
1 TABLET ORAL EVERY 6 HOURS PRN
Qty: 20 TABLET | Refills: 0 | Status: SHIPPED | OUTPATIENT
Start: 2024-04-29 | End: 2024-05-04

## 2024-04-29 RX ORDER — ONDANSETRON 2 MG/ML
INJECTION INTRAMUSCULAR; INTRAVENOUS PRN
Status: DISCONTINUED | OUTPATIENT
Start: 2024-04-29 | End: 2024-04-29 | Stop reason: SDUPTHER

## 2024-04-29 RX ORDER — MIDAZOLAM HYDROCHLORIDE 1 MG/ML
INJECTION INTRAMUSCULAR; INTRAVENOUS PRN
Status: DISCONTINUED | OUTPATIENT
Start: 2024-04-29 | End: 2024-04-29 | Stop reason: SDUPTHER

## 2024-04-29 RX ORDER — ROPIVACAINE HYDROCHLORIDE 5 MG/ML
INJECTION, SOLUTION EPIDURAL; INFILTRATION; PERINEURAL PRN
Status: DISCONTINUED | OUTPATIENT
Start: 2024-04-29 | End: 2024-04-29 | Stop reason: HOSPADM

## 2024-04-29 RX ORDER — SODIUM CHLORIDE 9 MG/ML
INJECTION, SOLUTION INTRAVENOUS PRN
Status: DISCONTINUED | OUTPATIENT
Start: 2024-04-29 | End: 2024-04-29 | Stop reason: HOSPADM

## 2024-04-29 RX ORDER — DEXAMETHASONE SODIUM PHOSPHATE 10 MG/ML
10 INJECTION, EMULSION INTRAMUSCULAR; INTRAVENOUS ONCE
Status: COMPLETED | OUTPATIENT
Start: 2024-04-29 | End: 2024-04-29

## 2024-04-29 RX ORDER — LIDOCAINE HCL/PF 100 MG/5ML
SYRINGE (ML) INJECTION PRN
Status: DISCONTINUED | OUTPATIENT
Start: 2024-04-29 | End: 2024-04-29 | Stop reason: SDUPTHER

## 2024-04-29 RX ORDER — ONDANSETRON 2 MG/ML
4 INJECTION INTRAMUSCULAR; INTRAVENOUS
Status: DISCONTINUED | OUTPATIENT
Start: 2024-04-29 | End: 2024-04-29 | Stop reason: HOSPADM

## 2024-04-29 RX ORDER — PROPOFOL 10 MG/ML
INJECTION, EMULSION INTRAVENOUS PRN
Status: DISCONTINUED | OUTPATIENT
Start: 2024-04-29 | End: 2024-04-29 | Stop reason: SDUPTHER

## 2024-04-29 RX ORDER — ROCURONIUM BROMIDE 10 MG/ML
INJECTION, SOLUTION INTRAVENOUS PRN
Status: DISCONTINUED | OUTPATIENT
Start: 2024-04-29 | End: 2024-04-29 | Stop reason: SDUPTHER

## 2024-04-29 RX ORDER — HYDRALAZINE HYDROCHLORIDE 20 MG/ML
10 INJECTION INTRAMUSCULAR; INTRAVENOUS
Status: DISCONTINUED | OUTPATIENT
Start: 2024-04-29 | End: 2024-04-29 | Stop reason: HOSPADM

## 2024-04-29 RX ORDER — LIDOCAINE HYDROCHLORIDE AND EPINEPHRINE 10; 10 MG/ML; UG/ML
INJECTION, SOLUTION INFILTRATION; PERINEURAL PRN
Status: DISCONTINUED | OUTPATIENT
Start: 2024-04-29 | End: 2024-04-29 | Stop reason: HOSPADM

## 2024-04-29 RX ADMIN — ONDANSETRON 4 MG: 2 INJECTION INTRAMUSCULAR; INTRAVENOUS at 15:54

## 2024-04-29 RX ADMIN — Medication 160 MG: at 13:12

## 2024-04-29 RX ADMIN — SODIUM CHLORIDE: 9 INJECTION, SOLUTION INTRAVENOUS at 12:48

## 2024-04-29 RX ADMIN — CEFTRIAXONE SODIUM 2000 MG: 2 INJECTION, POWDER, FOR SOLUTION INTRAMUSCULAR; INTRAVENOUS at 12:50

## 2024-04-29 RX ADMIN — PROPOFOL 200 MG: 10 INJECTION, EMULSION INTRAVENOUS at 13:12

## 2024-04-29 RX ADMIN — ROCURONIUM BROMIDE 10 MG: 10 INJECTION INTRAVENOUS at 14:13

## 2024-04-29 RX ADMIN — FENTANYL CITRATE 25 MCG: 50 INJECTION, SOLUTION INTRAMUSCULAR; INTRAVENOUS at 15:04

## 2024-04-29 RX ADMIN — ROCURONIUM BROMIDE 40 MG: 10 INJECTION INTRAVENOUS at 13:15

## 2024-04-29 RX ADMIN — FENTANYL CITRATE 50 MCG: 50 INJECTION, SOLUTION INTRAMUSCULAR; INTRAVENOUS at 13:12

## 2024-04-29 RX ADMIN — SODIUM CHLORIDE: 9 INJECTION, SOLUTION INTRAVENOUS at 13:56

## 2024-04-29 RX ADMIN — FENTANYL CITRATE 50 MCG: 50 INJECTION, SOLUTION INTRAMUSCULAR; INTRAVENOUS at 16:04

## 2024-04-29 RX ADMIN — ROCURONIUM BROMIDE 10 MG: 10 INJECTION INTRAVENOUS at 14:41

## 2024-04-29 RX ADMIN — MIDAZOLAM 2 MG: 1 INJECTION INTRAMUSCULAR; INTRAVENOUS at 13:07

## 2024-04-29 RX ADMIN — PROPOFOL 50 MG: 10 INJECTION, EMULSION INTRAVENOUS at 15:04

## 2024-04-29 RX ADMIN — SODIUM CHLORIDE 125 ML/HR: 9 INJECTION, SOLUTION INTRAVENOUS at 11:51

## 2024-04-29 RX ADMIN — PROPOFOL 50 MG: 10 INJECTION, EMULSION INTRAVENOUS at 14:57

## 2024-04-29 RX ADMIN — ROCURONIUM BROMIDE 10 MG: 10 INJECTION INTRAVENOUS at 15:16

## 2024-04-29 RX ADMIN — ROCURONIUM BROMIDE 10 MG: 10 INJECTION INTRAVENOUS at 13:12

## 2024-04-29 RX ADMIN — DEXAMETHASONE SODIUM PHOSPHATE 10 MG: 10 INJECTION, EMULSION INTRAMUSCULAR; INTRAVENOUS at 12:44

## 2024-04-29 RX ADMIN — Medication 100 MG: at 13:12

## 2024-04-29 RX ADMIN — FENTANYL CITRATE 25 MCG: 50 INJECTION, SOLUTION INTRAMUSCULAR; INTRAVENOUS at 14:56

## 2024-04-29 RX ADMIN — FENTANYL CITRATE 50 MCG: 50 INJECTION, SOLUTION INTRAMUSCULAR; INTRAVENOUS at 16:08

## 2024-04-29 RX ADMIN — SODIUM CHLORIDE: 9 INJECTION, SOLUTION INTRAVENOUS at 13:07

## 2024-04-29 RX ADMIN — ROCURONIUM BROMIDE 10 MG: 10 INJECTION INTRAVENOUS at 13:42

## 2024-04-29 RX ADMIN — SUGAMMADEX 200 MG: 100 INJECTION, SOLUTION INTRAVENOUS at 15:54

## 2024-04-29 ASSESSMENT — PAIN DESCRIPTION - FREQUENCY
FREQUENCY: INTERMITTENT
FREQUENCY: CONTINUOUS

## 2024-04-29 ASSESSMENT — PAIN SCALES - GENERAL
PAINLEVEL_OUTOF10: 1
PAINLEVEL_OUTOF10: 1
PAINLEVEL_OUTOF10: 7

## 2024-04-29 ASSESSMENT — PAIN DESCRIPTION - ONSET: ONSET: ON-GOING

## 2024-04-29 ASSESSMENT — PAIN DESCRIPTION - DESCRIPTORS
DESCRIPTORS: PRESSURE
DESCRIPTORS: BURNING

## 2024-04-29 ASSESSMENT — PAIN DESCRIPTION - ORIENTATION: ORIENTATION: INNER

## 2024-04-29 ASSESSMENT — PAIN - FUNCTIONAL ASSESSMENT
PAIN_FUNCTIONAL_ASSESSMENT: ACTIVITIES ARE NOT PREVENTED
PAIN_FUNCTIONAL_ASSESSMENT: ACTIVITIES ARE NOT PREVENTED

## 2024-04-29 ASSESSMENT — PAIN DESCRIPTION - LOCATION
LOCATION: OTHER (COMMENT)
LOCATION: NOSE

## 2024-04-29 ASSESSMENT — PAIN DESCRIPTION - PAIN TYPE
TYPE: SURGICAL PAIN
TYPE: CHRONIC PAIN

## 2024-04-29 NOTE — INTERVAL H&P NOTE
Pt Name: Mayur Simon  MRN: [id number]  YOB: 1990  Date of evaluation: 4/29/2024    I have examined the patient and reviewed the H&P/Consult and there are no changes to the patient or plans.         Electronically signed by ANANTH WILDER MD on 4/29/2024 at 12:32 PM

## 2024-04-29 NOTE — ANESTHESIA PRE PROCEDURE
Department of Anesthesiology  Preprocedure Note       Name:  Mayur Simon   Age:  33 y.o.  :  1990                                          MRN:  [id number]         Date:  2024      Surgeon: Surgeon(s):  Jan Campuzano MD    Procedure: Procedure(s):  Partial Submucosal resection of Bilateral Inferior Nasal Turbinates. Partial Resection of Heather Bullosa of Bilateral Middle and Bilateral Superior Nasal Turbinates. Image Guided Nasal Endoscopy with Bilateral Maxillary Antrostomy Right Sphenoidotomy with removal of tissue from sphenoid sinus    Medications prior to admission:   Prior to Admission medications    Medication Sig Start Date End Date Taking? Authorizing Provider   medical marijuana Take 1 each by mouth as needed (PRN pain).   Yes Anthony Carias MD   fluticasone (FLONASE) 50 MCG/ACT nasal spray 1 spray by Each Nostril route daily 24   Itzel Pino APRN - CNP   Naproxen Sodium (ALEVE) 220 MG CAPS Take by mouth daily    Anthony Carias MD   montelukast (SINGULAIR) 10 MG tablet Take 1 tablet by mouth nightly  Patient not taking: Reported on 2024    ProviderAnthony MD       Current medications:    Current Facility-Administered Medications   Medication Dose Route Frequency Provider Last Rate Last Admin   • dexAMETHasone (PF) (DECADRON) injection 10 mg  10 mg IntraVENous Once Jan Campuzano MD       • cefTRIAXone (ROCEPHIN) 2,000 mg in sodium chloride 0.9 % 50 mL IVPB (mini-bag)  2,000 mg IntraVENous Once Jan Campuzano MD       • sodium chloride flush 0.9 % injection 5-40 mL  5-40 mL IntraVENous 2 times per day Jan Campuzano MD       • sodium chloride flush 0.9 % injection 5-40 mL  5-40 mL IntraVENous PRN Jan Campuzano MD       • 0.9 % sodium chloride infusion   IntraVENous PRN Jan Campuzano  mL/hr at 24 1151 125 mL/hr at 24 1151       Allergies:    Allergies   Allergen Reactions   • Sulfa Antibiotics Swelling   • Seasonal

## 2024-04-29 NOTE — PROGRESS NOTES
Patient oriented to Same Day department and admitted to Same Day Surgery room 2.   Patient verbalized approval for first name, last initial with physician name on unit whiteboard.     Plan of care reviewed with patient.   Patient room whiteboard filled out and discussed with patient and responsible adult.   Patient and responsible adult offered Same Day Welcome Packet to review.    Call light in reach.   Bed in lowest position, locked, with one bed rail up.   SCDs and warming blanket in place.  Appropriate arm bands on patient.   Bathroom offered.   All questions and concerns of patient addressed.    Meds to Beds:   Patient informed of [facility] Meds to Beds program during admission. Patient is agreeable to program.   Contact information for the pharmacy and the Meds to Beds program:   Name: Natalie   Relationship to patient:spouse/significant other   Phone number: [phone]

## 2024-04-29 NOTE — PROGRESS NOTES
1609 Pt arrives to PACU, alert to voice. Resp easy and unlabored on room air. Pt states pain is a 1/10 and tolerable at this time. VSS    1615 Pt voided, see I&O's.     1625 Patient continues to deny pain, resp easy and unlabored on room air. VSS.     1635 Patient continues to rest in bed, alert and oriented. Pt denies pain. VSS    1639 Pt meets criteria for discharge from PACU, transported to [facility] in stable condition.     1640 Report given to Kalyani KIMBROUGH

## 2024-04-29 NOTE — PROGRESS NOTES
Pt has met discharge criteria and states he is ready for discharge to home. IV removed, gauze and tape applied. Dressed in own clothes and personal belongings gathered. Discharge instructions (with opioid medication education information) given to pt and family; pt and family verbalized understanding of discharge instructions, prescriptions and follow up appointments. Pt transported to discharge lobby by [facility] staff.

## 2024-04-29 NOTE — PROGRESS NOTES
Pt returned to [facility] room 2. Vitals and assessment as charted. 0.9 infusing, @450ml to count from PACU. Pt has crackers and water. Family at the bedside. Pt and family verbalized understanding of discharge criteria and call light use. Call light in reach.

## 2024-04-29 NOTE — DISCHARGE INSTRUCTIONS
NASAL SURGERY   POST-OP INSTRUCTION SHEET    1. Keep your scheduled post-operative appointment.  2. Do not blow your nose for 3 days after surgery. You may gently sniff   to clear drainage. No snorting.  3. Expect moderate amounts of bloody discharge for a few hours. Change your dressing   as needed if it becomes soiled. Place it on your upper lip and do not block the nostrils. If you are not having discharge you do not have to wear a dressing.   4. Activity should be gradually increased, but you should not lift over   10-15 lbs., or exercise more strenuously than fast walking for 3 days  following surgery. Straining to stabilize your core is the issue, not how much your arm is holding. You may return to work, with these guidelines in mind, as soon as you feel well enough, and are off narcotic pain medicine, unless work is in a cecelia/dirty environment.      Please follow additional post-op instructions provided by nursing staff upon leaving hospital.  If you have any questions or problems, please contact our office ar [phone]       What symptoms are normal?    Soreness in front of nose and/or upper teeth  Clear/bloody drainage during first 3 days is not unusual  Facial pains/headaches  Sutures inside of nose will either dissolve away or fall off-- May take up to 6 wks after surgery  Nasal stuffiness improves gradually over weeks.    Medicines:    You will get 3 prescriptions sent directly to your pharmacy     Use Afrin nasal spray, 8 drops each nostril on back with head hanging off edge of bed, stay five minutes, at least every 8 hours and at bedtime for five days.. Today at 6 PM, 10 PM, 6 AM, 12 NOON      Brand-name original AFRIN is more comfortable.  You may use that instead of the generic oxymetazoline from the hospital.    Apply Bacitracin ointment with a Q tip, inside about a half inch all around.   Three times a day for 2 weeks..then every night as needed for 4 weeks    Saline Sinus Rinse:   Twice a Day

## 2024-04-29 NOTE — BRIEF OP NOTE
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 Partial Resection of Heather Bullosa of Bilateral Middle and Bilateral Superior Nasal Turbinates. Image Guided Nasal Endoscopy with Bilateral Maxillary Antrostomy Bilateral Sphenoidotomy with removal of tissue from sphenoid sinus    Surgeon(s):  Jan Wilder MD    Assistant:  * No surgical staff found *    Anesthesia: General    Estimated Blood Loss (mL): less than 100     Complications: None    Specimens:   ID Type Source Tests Collected by Time Destination   A : PRODUCTS OF ESS Tissue Sinus SURGICAL PATHOLOGY Jan Wilder MD 4/29/2024 1608        Implants:  * No implants in log *      Drains:   [REMOVED] Urinary Catheter 04/29/24 (Removed)       Findings: Patient  Mild convex septal deviation to the left which made it more difficult to work on the left side behind that but was able to do that without a septoplasty.  Findings are well-described by the diagnoses.  The sphenoethmoidal recess was extremely tight as well as the ostiomeatal complex.  Heather bullosae of both middle and both superior turbinates were trimmed as needed.    Electronically signed by JAN WILDER MD on 4/29/2024 at 4:47 PM

## 2024-04-29 NOTE — ANESTHESIA POSTPROCEDURE EVALUATION
Department of Anesthesiology  Postprocedure Note    Patient: Mayur Simon  MRN: [id number]  YOB: 1990  Date of evaluation: 4/29/2024    Procedure Summary       Date: 04/29/24 Room / Location: [facility] OR  / [facility] OR    Anesthesia Start: 1307 Anesthesia Stop: 1613    Procedure: Partial Submucosal resection of Bilateral Inferior Nasal Turbinates. Partial Resection of Heather Bullosa of Bilateral Middle and Bilateral Superior Nasal Turbinates. 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    Allison Phase I: Allison Score: 10    Allison Phase II: Allison Score: 10    Anesthesia Post Evaluation    Patient location during evaluation: PACU  Patient participation: complete - patient participated  Level of consciousness: awake and alert  Pain score: 3  Airway patency: patent  Nausea & Vomiting: no nausea and no vomiting  Cardiovascular status: hemodynamically stable and blood pressure returned to baseline  Respiratory status: spontaneous ventilation, room air and acceptable  Hydration status: stable  Pain management: adequate and satisfactory to patient    No notable events

## 2024-04-29 NOTE — H&P
[facility] [place] [facility]  [facility] EAR, NOSE AND THROAT  [address]  [address]  [facility] [address]  Dept: [phone]  Dept Fax: [phone]  Loc: [phone]    Mayur Simon is a 33 y.o. male who has Turbinator and sinus surgery scheduled.  This is a preop H&P..    HPI:   33-year-old male has chronic nasal obstruction, large francisco bullosae in both middle and both superior turbinates causing contact with the septum and outflow obstruction, he has ostiomeatal obstruction with bilateral maxillary sinus mucosal thickening, soft tissue densities in both sphenoid sinuses.  Medical therapy has failed.    Prior visit documentation 02/20/2024:   Mayur Simon presents today for CT review. Patient describes since interval visit he felt some slight improvement to his nasal obstructive symptoms with utilization of the oral antihistamine and Flonase nasal spray but this only lasted for approximately one week and severity of nasal obstruction returned. He continues to endorse significant facial pressure/pain that is worse in the morning first thing when he awakens. Patient continues to describe he frequently tries to blow his nose and never produces any mucous but constantly has the sensation that he needs to clear his nose.  No personal or family history of bleeding disorders or complications with anesthesia. Patient denies cardiac or lung disorders. No other questions or concerns noted at this time. Patient describes today that he is extremely ready for any surgical recommendations as he has dealt with his nasal symptoms for many years and it feels as to be gradually worsening and impacting his daily life.    Previous visit documentation 1/17/24: Mayur Simon presents today for nasal symptoms.  Patient states for the last several years he is endorse that it is significantly hard to breathe out of his nose.  He describes that he constantly feels like he is mouth breathing and it is

## 2024-04-30 ENCOUNTER — OFFICE VISIT (OUTPATIENT)
Dept: ENT CLINIC | Age: 34
End: 2024-04-30

## 2024-04-30 VITALS
WEIGHT: 189.1 LBS | DIASTOLIC BLOOD PRESSURE: 82 MMHG | HEART RATE: 69 BPM | OXYGEN SATURATION: 99 % | HEIGHT: 74 IN | BODY MASS INDEX: 24.27 KG/M2 | TEMPERATURE: 97.8 F | SYSTOLIC BLOOD PRESSURE: 104 MMHG

## 2024-04-30 DIAGNOSIS — J34.89 CONCHA BULLOSA: ICD-10-CM

## 2024-04-30 DIAGNOSIS — J32.0 CHRONIC MAXILLARY SINUSITIS: ICD-10-CM

## 2024-04-30 DIAGNOSIS — J32.3 CHRONIC SPHENOIDAL SINUSITIS: ICD-10-CM

## 2024-04-30 DIAGNOSIS — J34.89 OSTIOMEATAL COMPLEX OBSTRUCTION OF PARANASAL SINUS: ICD-10-CM

## 2024-04-30 DIAGNOSIS — J34.3 HYPERTROPHY OF BOTH INFERIOR NASAL TURBINATES: ICD-10-CM

## 2024-04-30 DIAGNOSIS — R51.9 RHINOGENIC HEADACHE: ICD-10-CM

## 2024-04-30 DIAGNOSIS — J34.2 NASAL SEPTAL DEVIATION: Primary | ICD-10-CM

## 2024-04-30 PROCEDURE — 99024 POSTOP FOLLOW-UP VISIT: CPT | Performed by: OTOLARYNGOLOGY

## 2024-04-30 NOTE — OP NOTE
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 Partial Resection of Heather Bullosa of Bilateral Middle and Bilateral Superior Nasal Turbinates. Image Guided Nasal Endoscopy with Bilateral Maxillary Antrostomy Bilateral Sphenoidotomy without removal of tissue from sphenoid sinus     Surgeon(s):  Jan Campuzano MD     Assistant:  * No surgical staff found *     Anesthesia: General     Estimated Blood Loss (mL): less than 100      Complications: None     Specimens:   ID Type Source Tests Collected by Time Destination   A : PRODUCTS OF ESS Tissue Sinus SURGICAL PATHOLOGY Jan Campuzano MD 4/29/2024 1608           Implants:  * No implants in log *      Drains:   [REMOVED] Urinary Catheter 04/29/24 (Removed)         Findings: Patient  Mild convex septal deviation to the left which made it more difficult to work on the left side behind that but was able to do that without a septoplasty.  Findings are well-described by the diagnoses.  The sphenoethmoidal recess was extremely tight as well as the ostiomeatal complex.  Heather bullosae of both middle and both superior turbinates were trimmed as needed.    Detailed Description of Procedure:   After an adequate level of general endotracheal anesthesia had been obtained, the face was prepped and draped in an aseptic fashion in the usual manner for nasal surgery.  Nose was decongested vasoconstricted and anesthetized in the
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
## 2024-05-07 ENCOUNTER — OFFICE VISIT (OUTPATIENT)
Dept: ENT CLINIC | Age: 34
End: 2024-05-07
Payer: OTHER GOVERNMENT

## 2024-05-07 VITALS
HEIGHT: 74 IN | OXYGEN SATURATION: 98 % | WEIGHT: 189.2 LBS | BODY MASS INDEX: 24.28 KG/M2 | DIASTOLIC BLOOD PRESSURE: 80 MMHG | HEART RATE: 87 BPM | SYSTOLIC BLOOD PRESSURE: 118 MMHG | RESPIRATION RATE: 18 BRPM | TEMPERATURE: 97.5 F

## 2024-05-07 DIAGNOSIS — J34.89 CONCHA BULLOSA: ICD-10-CM

## 2024-05-07 DIAGNOSIS — J32.3 CHRONIC SPHENOIDAL SINUSITIS: ICD-10-CM

## 2024-05-07 DIAGNOSIS — J34.2 NASAL SEPTAL DEVIATION: Primary | ICD-10-CM

## 2024-05-07 DIAGNOSIS — J34.3 HYPERTROPHY OF BOTH INFERIOR NASAL TURBINATES: ICD-10-CM

## 2024-05-07 DIAGNOSIS — J32.0 CHRONIC MAXILLARY SINUSITIS: ICD-10-CM

## 2024-05-07 DIAGNOSIS — R51.9 RHINOGENIC HEADACHE: ICD-10-CM

## 2024-05-07 DIAGNOSIS — J34.89 OSTIOMEATAL COMPLEX OBSTRUCTION OF PARANASAL SINUS: ICD-10-CM

## 2024-05-07 PROCEDURE — 99212 OFFICE O/P EST SF 10 MIN: CPT | Performed by: OTOLARYNGOLOGY
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
## 2024-05-14 ENCOUNTER — OFFICE VISIT (OUTPATIENT)
Dept: ENT CLINIC | Age: 34
End: 2024-05-14
Payer: OTHER GOVERNMENT

## 2024-05-14 VITALS
DIASTOLIC BLOOD PRESSURE: 74 MMHG | HEART RATE: 97 BPM | BODY MASS INDEX: 24.93 KG/M2 | SYSTOLIC BLOOD PRESSURE: 126 MMHG | OXYGEN SATURATION: 97 % | HEIGHT: 74 IN | WEIGHT: 194.3 LBS | RESPIRATION RATE: 16 BRPM | TEMPERATURE: 97.2 F

## 2024-05-14 DIAGNOSIS — R51.9 RHINOGENIC HEADACHE: ICD-10-CM

## 2024-05-14 DIAGNOSIS — J32.0 CHRONIC MAXILLARY SINUSITIS: ICD-10-CM

## 2024-05-14 DIAGNOSIS — R06.5 MOUTH BREATHING: ICD-10-CM

## 2024-05-14 DIAGNOSIS — J34.3 HYPERTROPHY OF BOTH INFERIOR NASAL TURBINATES: ICD-10-CM

## 2024-05-14 DIAGNOSIS — J32.3 CHRONIC SPHENOIDAL SINUSITIS: ICD-10-CM

## 2024-05-14 DIAGNOSIS — J34.89 OSTIOMEATAL COMPLEX OBSTRUCTION OF PARANASAL SINUS: ICD-10-CM

## 2024-05-14 DIAGNOSIS — J34.89 CONCHA BULLOSA: ICD-10-CM

## 2024-05-14 DIAGNOSIS — J34.2 NASAL SEPTAL DEVIATION: Primary | ICD-10-CM

## 2024-05-14 PROCEDURE — 31237 NSL/SINS NDSC SURG BX POLYPC: CPT | Performed by: OTOLARYNGOLOGY

## 2024-05-14 PROCEDURE — 99212 OFFICE O/P EST SF 10 MIN: CPT | Performed by: OTOLARYNGOLOGY
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
## 2024-05-24 ENCOUNTER — OFFICE VISIT (OUTPATIENT)
Dept: ENT CLINIC | Age: 34
End: 2024-05-24

## 2024-05-24 VITALS
OXYGEN SATURATION: 97 % | SYSTOLIC BLOOD PRESSURE: 116 MMHG | WEIGHT: 196 LBS | HEIGHT: 74 IN | TEMPERATURE: 98 F | BODY MASS INDEX: 25.15 KG/M2 | HEART RATE: 80 BPM | RESPIRATION RATE: 18 BRPM | DIASTOLIC BLOOD PRESSURE: 72 MMHG

## 2024-05-24 DIAGNOSIS — Z98.890 HISTORY OF SINUS SURGERY: Primary | ICD-10-CM

## 2024-05-24 PROCEDURE — 99024 POSTOP FOLLOW-UP VISIT: CPT | Performed by: NURSE PRACTITIONER
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
## (undated) DEVICE — AGENT HEMOSTATIC SURGIFLOW MATRIX KIT W/THROMBIN

## (undated) DEVICE — ENT: Brand: MEDLINE INDUSTRIES, INC.

## (undated) DEVICE — BLADE 1884080EM TRICUT 4MMX13CM M4 ROHS: Brand: FUSION®

## (undated) DEVICE — INSTRUMENT TRACKER 9733533XOM ENT 1PK

## (undated) DEVICE — PACK-MAJOR

## (undated) DEVICE — DRESSING TRNSPAR W2XL2.75IN FLM SHT SEMIPERMEABLE WIND

## (undated) DEVICE — SYRINGE MED 10ML TRNSLUC BRL PLUNG BLK MRK POLYPR CTRL

## (undated) DEVICE — TUBING, SUCTION, 1/4" X 20', STRAIGHT: Brand: MEDLINE INDUSTRIES, INC.

## (undated) DEVICE — TUBING 1895522 5PK STRAIGHTSHOT TO XPS: Brand: STRAIGHTSHOT®

## (undated) DEVICE — DRESSING TRNSPAR W5XL4.5IN FLM SHT SEMIPERMEABLE WIND

## (undated) DEVICE — TURBINATOR WAND: Brand: COBLATION

## (undated) DEVICE — NEEDLE HYPO 27GA L15IN REG BVL W O SFTY FOR SYR DISPOSABLE

## (undated) DEVICE — PATIENT TRACKER 9734887XOM NON-INVASIVE